# Patient Record
Sex: MALE | Race: WHITE | Employment: FULL TIME | URBAN - METROPOLITAN AREA
[De-identification: names, ages, dates, MRNs, and addresses within clinical notes are randomized per-mention and may not be internally consistent; named-entity substitution may affect disease eponyms.]

---

## 2017-02-24 ENCOUNTER — OFFICE VISIT (OUTPATIENT)
Dept: INTERNAL MEDICINE CLINIC | Age: 37
End: 2017-02-24

## 2017-02-24 VITALS
HEART RATE: 91 BPM | SYSTOLIC BLOOD PRESSURE: 112 MMHG | HEIGHT: 72 IN | WEIGHT: 168 LBS | BODY MASS INDEX: 22.75 KG/M2 | TEMPERATURE: 98.9 F | OXYGEN SATURATION: 97 % | DIASTOLIC BLOOD PRESSURE: 65 MMHG | RESPIRATION RATE: 18 BRPM

## 2017-02-24 DIAGNOSIS — J01.00 ACUTE MAXILLARY SINUSITIS, RECURRENCE NOT SPECIFIED: Primary | ICD-10-CM

## 2017-02-24 RX ORDER — AMOXICILLIN AND CLAVULANATE POTASSIUM 875; 125 MG/1; MG/1
1 TABLET, FILM COATED ORAL 2 TIMES DAILY
Qty: 20 TAB | Refills: 0 | Status: SHIPPED | OUTPATIENT
Start: 2017-02-24 | End: 2019-02-08 | Stop reason: ALTCHOICE

## 2017-02-24 NOTE — PATIENT INSTRUCTIONS
Sinusitis: Care Instructions  Your Care Instructions    Sinusitis is an infection of the lining of the sinus cavities in your head. Sinusitis often follows a cold. It causes pain and pressure in your head and face. In most cases, sinusitis gets better on its own in 1 to 2 weeks. But some mild symptoms may last for several weeks. Sometimes antibiotics are needed. Follow-up care is a key part of your treatment and safety. Be sure to make and go to all appointments, and call your doctor if you are having problems. It's also a good idea to know your test results and keep a list of the medicines you take. How can you care for yourself at home? · Take an over-the-counter pain medicine, such as acetaminophen (Tylenol), ibuprofen (Advil, Motrin), or naproxen (Aleve). Read and follow all instructions on the label. · If the doctor prescribed antibiotics, take them as directed. Do not stop taking them just because you feel better. You need to take the full course of antibiotics. · Be careful when taking over-the-counter cold or flu medicines and Tylenol at the same time. Many of these medicines have acetaminophen, which is Tylenol. Read the labels to make sure that you are not taking more than the recommended dose. Too much acetaminophen (Tylenol) can be harmful. · Breathe warm, moist air from a steamy shower, a hot bath, or a sink filled with hot water. Avoid cold, dry air. Using a humidifier in your home may help. Follow the directions for cleaning the machine. · Use saline (saltwater) nasal washes to help keep your nasal passages open and wash out mucus and bacteria. You can buy saline nose drops at a grocery store or drugstore. Or you can make your own at home by adding 1 teaspoon of salt and 1 teaspoon of baking soda to 2 cups of distilled water. If you make your own, fill a bulb syringe with the solution, insert the tip into your nostril, and squeeze gently. [place] your nose.   · Put a hot, wet towel or a warm gel pack on your face 3 or 4 times a day for 5 to 10 minutes each time. · Try a decongestant nasal spray like oxymetazoline (Afrin). Do not use it for more than 3 days in a row. Using it for more than 3 days can make your congestion worse. When should you call for help? Call your doctor now or seek immediate medical care if:  · You have new or worse swelling or redness in your face or around your eyes. · You have a new or higher fever. Watch closely for changes in your health, and be sure to contact your doctor if:  · You have new or worse facial pain. · The mucus from your nose becomes thicker (like pus) or has new blood in it. · You are not getting better as expected. Where can you learn more? Go to http://onur-antonieta.info/. Enter M580 in the search box to learn more about \"Sinusitis: Care Instructions. \"  Current as of: July 29, 2016  Content Version: 11.1  © [id number] [employer]. Care instructions adapted under license by MyEnergy (which disclaims liability or warranty for this information). If you have questions about a medical condition or this instruction, always ask your healthcare professional. [place] [address] any warranty or liability for your use of this information.

## 2017-02-24 NOTE — PROGRESS NOTES
Have you been to the ER or urgent care clinic since your last visit? No     Have you been hospitalized since your last visit? No     Have you been seen or consulted any other health care provider outside of [address] [place] [address] since your last visit (including pap smears, colonoscopy screening)?   No

## 2017-02-24 NOTE — MR AVS SNAPSHOT
Visit Information Date & Time Provider Department Dept. Phone Encounter #  
 2/24/2017  4:00 PM Jaja Joshua NP [facility] [address] [facility] [id number] Upcoming Health Maintenance Date Due INFLUENZA AGE 9 TO ADULT 8/1/2016 DTaP/Tdap/Td series (2 - Td) 12/19/2024 Allergies as of 2/24/2017  Review Complete On: 2/24/2017 By: Jaja Joshua NP No Known Allergies Current Immunizations  Reviewed on 1/14/2016 Name Date Hep B Vaccine 1/1/1997 Influenza Vaccine 12/19/2014, 1/1/2007 MMR 1/1/1997 TB Skin Test (PPD) 1/1/2001 Tdap 12/19/2014 Not reviewed this visit You Were Diagnosed With   
  
 Codes Comments Acute maxillary sinusitis, recurrence not specified    -  Primary ICD-10-CM: J01.00 ICD-9-CM: 461.0 Vitals BP  
  
  
  
  
  
 112/65 (BP 1 Location: Left arm, BP Patient Position: Sitting) BMI and BSA Data Body Mass Index Body Surface Area 22.78 kg/m 2 1.97 m 2 Preferred Pharmacy Pharmacy Name Phone [facility] - [address], [facility] [phone] Your Updated Medication List  
  
   
This list is accurate as of: 2/24/17  4:19 PM.  Always use your most recent med list.  
  
  
  
  
 amoxicillin-clavulanate 875-125 mg per tablet Commonly known as:  AUGMENTIN Take 1 Tab by mouth two (2) times a day. escitalopram oxalate 10 mg tablet Commonly known as:  Eugena Julio Take 1 Tab by mouth daily. ibuprofen 200 mg tablet Commonly known as:  MOTRIN Take  by mouth every six (6) hours as needed. loratadine 10 mg tablet Commonly known as:  Drena Magic Take 10 mg by mouth.  
  
 pseudoephedrine 30 mg tablet Commonly known as:  SUDAFED Take  by mouth every four (4) hours as needed for Congestion. XANAX PO Take  by mouth. Prescriptions Sent to Pharmacy Refills amoxicillin-clavulanate (AUGMENTIN) 875-125 mg per tablet 0 Sig: Take 1 Tab by mouth two (2) times a day. Class: Normal  
 Pharmacy: [address] #: [phone] Route: Oral  
  
Patient Instructions Sinusitis: Care Instructions Your Care Instructions Sinusitis is an infection of the lining of the sinus cavities in your head. Sinusitis often follows a cold. It causes pain and pressure in your head and face. In most cases, sinusitis gets better on its own in 1 to 2 weeks. But some mild symptoms may last for several weeks. Sometimes antibiotics are needed. Follow-up care is a key part of your treatment and safety. Be sure to make and go to all appointments, and call your doctor if you are having problems. It's also a good idea to know your test results and keep a list of the medicines you take. How can you care for yourself at home? · Take an over-the-counter pain medicine, such as acetaminophen (Tylenol), ibuprofen (Advil, Motrin), or naproxen (Aleve). Read and follow all instructions on the label. · If the doctor prescribed antibiotics, take them as directed. Do not stop taking them just because you feel better. You need to take the full course of antibiotics. · Be careful when taking over-the-counter cold or flu medicines and Tylenol at the same time. Many of these medicines have acetaminophen, which is Tylenol. Read the labels to make sure that you are not taking more than the recommended dose. Too much acetaminophen (Tylenol) can be harmful. · Breathe warm, moist air from a steamy shower, a hot bath, or a sink filled with hot water. Avoid cold, dry air. Using a humidifier in your home may help. Follow the directions for cleaning the machine. · Use saline (saltwater) nasal washes to help keep your nasal passages open and wash out mucus and bacteria.  You can buy saline nose drops at a grocery store or drugstore. Or you can make your own at home by adding 1 teaspoon of salt and 1 teaspoon of baking soda to 2 cups of distilled water. If you make your own, fill a bulb syringe with the solution, insert the tip into your nostril, and squeeze gently. A[place] Ruffini your nose. · Put a hot, wet towel or a warm gel pack on your face 3 or 4 times a day for 5 to 10 minutes each time. · Try a decongestant nasal spray like oxymetazoline (Afrin). Do not use it for more than 3 days in a row. Using it for more than 3 days can make your congestion worse. When should you call for help? Call your doctor now or seek immediate medical care if: 
· You have new or worse swelling or redness in your face or around your eyes. · You have a new or higher fever. Watch closely for changes in your health, and be sure to contact your doctor if: 
· You have new or worse facial pain. · The mucus from your nose becomes thicker (like pus) or has new blood in it. · You are not getting better as expected. Where can you learn more? Go to http://onur-antonieta.info/. Enter B659 in the search box to learn more about \"Sinusitis: Care Instructions. \" Current as of: July 29, 2016 Content Version: 11.1 © 6388-7325 [employer] Incorporated. Care instructions adapted under license by Athlete[employer] (which disclaims liability or warranty for this information). If you have questions about a medical condition or this instruction, always ask your healthcare professional. [place] [address] any warranty or liability for your use of this information. Introducing [facility] & HEALTH SERVICES! Dear Becca Geiger: 
Thank you for requesting a [facility] account. Our records indicate that you have previously registered for a [facility] account but its currently inactive. Please call our [facility] support line at [phone]. Additional Information If you have questions, please visit the Frequently Asked Questions section of the [employer] website at [email or web address] Remember, [employer] is NOT to be used for urgent needs. For medical emergencies, dial 911. Now available from your iPhone and Android! Please provide this summary of care documentation to your next provider. Your primary care clinician is listed as Milo Ortega. If you have any questions after today's visit, please call [phone].

## 2017-02-25 NOTE — PROGRESS NOTES
HISTORY OF PRESENT ILLNESS  Deja De Luna is a 43 y.o. male. HPI  Upper respiratory illness:  Deja De Luna presents with complaints of congestion, sore throat, post nasal drip, headache, generalized sinus pain and green nasal discharge for 2 weeks. Began as cold symptoms that have progressively worsened. No nausea and no vomiting . he has not had  fever and chills. Symptoms are moderate. Over-the-counter remedies including Mucinex, Sudafed   has been used with poor relief of symptoms. Drinking plenty of fluids: yes  Asthma?:  no  non-smoker  Contacts with similar infections: no       Review of Systems   Constitutional: Negative for chills and fever. HENT: Positive for congestion and sore throat. Respiratory: Negative for cough and shortness of breath. Cardiovascular: Negative for chest pain and palpitations. Gastrointestinal: Negative for nausea and vomiting. Musculoskeletal: Negative for myalgias. Neurological: Positive for headaches. Negative for dizziness and tingling. /65 (BP 1 Location: Left arm, BP Patient Position: Sitting)  Pulse 91  Temp 98.9 °F (37.2 °C) (Oral)   Resp 18  Ht 6' (1.829 m)  Wt 168 lb (76.2 kg)  SpO2 97%  BMI 22.78 kg/m2  Physical Exam   Constitutional: He is oriented to person, place, and time. He appears well-developed and well-nourished. HENT:   Head: Normocephalic and atraumatic. Right Ear: External ear normal.   Left Ear: External ear normal.   Nose: Mucosal edema present. Right sinus exhibits maxillary sinus tenderness. Left sinus exhibits maxillary sinus tenderness. Mouth/Throat: Posterior oropharyngeal erythema present. Neck: Normal range of motion. Neck supple. No thyromegaly present. Cardiovascular: Normal rate and regular rhythm. Pulmonary/Chest: Effort normal and breath sounds normal. He has no wheezes. Lymphadenopathy:     He has no cervical adenopathy.    Neurological: He is alert and oriented to person, place, and time. Psychiatric: He has a normal mood and affect. His behavior is normal.   Nursing note and vitals reviewed. ASSESSMENT and PLAN  Stephen Adams was seen today for pressure behind the eyes. Diagnoses and all orders for this visit:    Acute maxillary sinusitis, recurrence not specified -- has failed conservative therapy  -     amoxicillin-clavulanate (AUGMENTIN) 875-125 mg per tablet; Take 1 Tab by mouth two (2) times a day.       reviewed diet, exercise and weight control  reviewed medications and side effects in detail

## 2017-03-01 ENCOUNTER — OFFICE VISIT (OUTPATIENT)
Dept: INTERNAL MEDICINE CLINIC | Age: 37
End: 2017-03-01

## 2017-03-01 VITALS
RESPIRATION RATE: 12 BRPM | WEIGHT: 167 LBS | TEMPERATURE: 98.6 F | DIASTOLIC BLOOD PRESSURE: 75 MMHG | BODY MASS INDEX: 22.62 KG/M2 | SYSTOLIC BLOOD PRESSURE: 108 MMHG | HEART RATE: 96 BPM | HEIGHT: 72 IN

## 2017-03-01 DIAGNOSIS — F41.9 ANXIETY: Primary | ICD-10-CM

## 2017-03-01 RX ORDER — ESCITALOPRAM OXALATE 10 MG/1
10 TABLET ORAL DAILY
Qty: 30 TAB | Refills: 2 | Status: SHIPPED | OUTPATIENT
Start: 2017-03-01 | End: 2019-02-08 | Stop reason: ALTCHOICE

## 2017-03-01 NOTE — PROGRESS NOTES
HISTORY OF PRESENT ILLNESS    Chief Complaint   Patient presents with    Anxiety       Presents for follow-up    He left his job at [employer]. 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 Recent sinusitis. Taking augmentin. Reports mild post-nasal drip and sore throat now. Improving. Anxiety  Patient is seen for anxiety disorder. Current treatment includes Lexapro 10 mg and rare xanax. Better since he left work. Ongoing symptoms include: none. Patient denies: sweating, chest pain, dizziness, paresthesias, racing thoughts, feelings of losing control, difficulty concentrating, suicidal ideation. Denies substance or alcohol abuse. Reported side effects from the treatment: none. He is ready to cut back lexapro    Review of Systems   All other systems reviewed and are negative, except as noted in HPI    Past Medical and Surgical History   has a past medical history of Anxiety; Cerumen impaction; Deafness in right ear; Heart palpitations; Incomplete right bundle branch block (8/2014); Lipoma of arm (9/8/2014); Normal cardiac stress test (9/2/14); Paresthesia; and Patellofemoral instability. has a past surgical history that includes other surgical; wisdom teeth extraction (1997); and knee arthroscopy (Left, 4/2011, 10/2011). reports that he has never smoked. He has never used smokeless tobacco. He reports that he drinks about 1.0 oz of alcohol per week  He reports that he does not use illicit drugs. family history includes Cancer in his father and maternal grandfather; Diabetes in his mother; Hypertension in his father. Physical Exam   Nursing note and vitals reviewed. Blood pressure 108/75, pulse 96, temperature 98.6 °F (37 °C), temperature source Oral, resp. rate 12, height 6' (1.829 m), weight 167 lb (75.8 kg). Constitutional:  No distress. Eyes: Conjunctivae are normal.   Ears:  Hearing grossly intact  Cardiovascular: Normal rate.   regular rhythm, no murmurs or gallops  No edema  Pulmonary/Chest: Effort normal.   CTAB  Musculoskeletal: moves all 4 extremities   Neurological: Alert and oriented to person, place, and time. Skin: No rash noted. Psychiatric: Normal mood and affect. Behavior is normal.     ASSESSMENT and PLAN  Sergo Shaikh was seen today for anxiety. Diagnoses and all orders for this visit:    Anxiety  Controlled on current regimen. He can wean back lexapro to 5 mg daily for 1 month, then consider stopping if doing well  Less stress at work. Orders:  -     escitalopram oxalate (LEXAPRO) 10 mg tablet; Take 1 Tab by mouth daily. URI (upper respiratory infection)  Mild s/s. Try mucinex 1200mg twice daily, tylenol or advil as directed on bottle. lab results and schedule of future lab studies reviewed with patient  reviewed medications and side effects in detail    Return to clinic for further evaluation if new symptoms develop    Follow-up Disposition: Not on File    Current Outpatient Prescriptions   Medication Sig    GUAIFENESIN PO Take  by mouth.  escitalopram oxalate (LEXAPRO) 10 mg tablet Take 1 Tab by mouth daily.  amoxicillin-clavulanate (AUGMENTIN) 875-125 mg per tablet Take 1 Tab by mouth two (2) times a day.  pseudoephedrine (SUDAFED) 30 mg tablet Take  by mouth every four (4) hours as needed for Congestion.  loratadine (CLARITIN) 10 mg tablet Take 10 mg by mouth.  ibuprofen (MOTRIN) 200 mg tablet Take  by mouth every six (6) hours as needed. No current facility-administered medications for this visit.

## 2017-03-01 NOTE — MR AVS SNAPSHOT
Visit Information Date & Time Provider Department Dept. Phone Encounter #  
 3/1/2017  4:00 PM Arturo Jones MD [facility] [address] [id number] Upcoming Health Maintenance Date Due INFLUENZA AGE 9 TO ADULT 8/1/2016 DTaP/Tdap/Td series (2 - Td) 12/19/2024 Allergies as of 3/1/2017  Review Complete On: 3/1/2017 By: Arturo Jones MD  
 No Known Allergies Current Immunizations  Reviewed on 1/14/2016 Name Date Hep B Vaccine 1/1/1997 Influenza Vaccine 12/19/2014, 1/1/2007 MMR 1/1/1997 TB Skin Test (PPD) 1/1/2001 Tdap 12/19/2014 Not reviewed this visit You Were Diagnosed With   
  
 Codes Comments Anxiety    -  Primary ICD-10-CM: F41.9 ICD-9-CM: 300.00 Vitals BP  
  
  
  
  
  
 108/75 (BP 1 Location: Left arm, BP Patient Position: Sitting) Vitals History BMI and BSA Data Body Mass Index Body Surface Area  
 22.65 kg/m 2 1.96 m 2 Preferred Pharmacy Pharmacy Name Phone [facility] - [address], [facility] [phone] Your Updated Medication List  
  
   
This list is accurate as of: 3/1/17  4:28 PM.  Always use your most recent med list.  
  
  
  
  
 amoxicillin-clavulanate 875-125 mg per tablet Commonly known as:  AUGMENTIN Take 1 Tab by mouth two (2) times a day. escitalopram oxalate 10 mg tablet Commonly known as:  Mcmahon Bias Take 1 Tab by mouth daily. GUAIFENESIN PO Take  by mouth. ibuprofen 200 mg tablet Commonly known as:  MOTRIN Take  by mouth every six (6) hours as needed. loratadine 10 mg tablet Commonly known as:  Farrah Soda Take 10 mg by mouth.  
  
 pseudoephedrine 30 mg tablet Commonly known as:  SUDAFED Take  by mouth every four (4) hours as needed for Congestion. Prescriptions Sent to Pharmacy  Refills  
 escitalopram oxalate (LEXAPRO) 10 mg tablet 2  
 Sig: Take 1 Tab by mouth daily. Class: Normal  
 Pharmacy: [facility], [address] [place] [address] #: [phone] Route: Oral  
  
Introducing [facility] SERVICES! Dear Becca Geiger: 
Thank you for requesting a [employer] account. Our records indicate that you have previously registered for a [employer]Halo account but its currently inactive. Please call our [employer] support line at [phone]. Additional Information If you have questions, please visit the Frequently Asked Questions section of the grabHalo website at [email or web address] Remember, grabHalo is NOT to be used for urgent needs. For medical emergencies, dial 911. Now available from your iPhone and Android! Please provide this summary of care documentation to your next provider. Your primary care clinician is listed as Isaac Lennon. If you have any questions after today's visit, please call [phone].

## 2017-10-19 ENCOUNTER — OFFICE VISIT (OUTPATIENT)
Dept: INTERNAL MEDICINE CLINIC | Age: 37
End: 2017-10-19

## 2017-10-19 DIAGNOSIS — Z23 ENCOUNTER FOR IMMUNIZATION: Primary | ICD-10-CM

## 2017-10-19 NOTE — PROGRESS NOTES
Pharmacy Note - Immunizations    María Calderon is a 43 y.o.  male  who presents for a flu shot. He denies any symptoms, reactions or allergies that would exclude them from being immunized today. Risks and adverse reactions were discussed and the VIS was given to them. All questions were addressed. Verbal order received from [address] This Encounter    Influenza virus vaccine (QUADRIVALENT PRES FREE SYRINGE) IM ([id number])    NE IMMUNIZ ADMIN,1 SINGLE/COMB VAC/TOXOID       Vaccine was given with no complications or adverse reactions.     Gay Webb, PharmD, BCPS, CDE

## 2017-12-18 ENCOUNTER — TELEPHONE (OUTPATIENT)
Dept: INTERNAL MEDICINE CLINIC | Age: 37
End: 2017-12-18

## 2017-12-18 RX ORDER — AMOXICILLIN AND CLAVULANATE POTASSIUM 875; 125 MG/1; MG/1
1 TABLET, FILM COATED ORAL 2 TIMES DAILY
Qty: 20 TAB | Refills: 0 | Status: SHIPPED | OUTPATIENT
Start: 2017-12-18 | End: 2017-12-28

## 2017-12-18 NOTE — TELEPHONE ENCOUNTER
Reached out to patient to schedule an acute appointment.  Patient schedule an appointment for 12/19 @ 10:40 w /NP for URI

## 2017-12-18 NOTE — TELEPHONE ENCOUNTER
----- Message from Lorne Zeng sent at 12/18/2017 11:52 AM EST -----  Regarding: Dr. Verna Gallagher  Pt is having head cold sx with drainage for the past couple of days and would like to be seen today with any provider. Best contact number [phone].

## 2018-02-28 RX ORDER — CLOTRIMAZOLE AND BETAMETHASONE DIPROPIONATE 10; .64 MG/G; MG/G
CREAM TOPICAL 2 TIMES DAILY
Qty: 30 G | Refills: 0 | Status: SHIPPED | OUTPATIENT
Start: 2018-02-28 | End: 2019-02-08 | Stop reason: ALTCHOICE

## 2019-02-08 ENCOUNTER — OFFICE VISIT (OUTPATIENT)
Dept: INTERNAL MEDICINE CLINIC | Age: 39
End: 2019-02-08

## 2019-02-08 VITALS
TEMPERATURE: 98.9 F | DIASTOLIC BLOOD PRESSURE: 81 MMHG | WEIGHT: 180 LBS | RESPIRATION RATE: 18 BRPM | HEIGHT: 72 IN | OXYGEN SATURATION: 94 % | HEART RATE: 77 BPM | SYSTOLIC BLOOD PRESSURE: 121 MMHG | BODY MASS INDEX: 24.38 KG/M2

## 2019-02-08 DIAGNOSIS — B35.4 TINEA CORPORIS: Primary | ICD-10-CM

## 2019-02-08 RX ORDER — FLUCONAZOLE 150 MG/1
150 TABLET ORAL
Qty: 4 TAB | Refills: 0 | Status: SHIPPED | OUTPATIENT
Start: 2019-02-08 | End: 2019-03-02

## 2019-02-08 RX ORDER — KETOCONAZOLE 20 MG/G
CREAM TOPICAL DAILY
Qty: 60 G | Refills: 1 | Status: SHIPPED | OUTPATIENT
Start: 2019-02-08 | End: 2020-04-27 | Stop reason: ALTCHOICE

## 2019-02-08 NOTE — PATIENT INSTRUCTIONS
Ringworm: Care Instructions  Your Care Instructions  Ringworm is a fungus infection of the skin. It is not caused by a worm. Ringworm causes a round, scaly rash that may crack and itch. The rash can spread over a wide area. One type of fungus that causes ringworm is often found in locker rooms and swimming pools. It grows well in warm, moist areas of the skin, such as in skin folds. You can get ringworm by sharing towels, clothing, and sports equipment. You can also get it by touching someone who has ringworm. Ringworm is treated with cream that kills the fungus. If the rash is widespread, you may need pills to get rid of it. Ringworm often comes back after treatment. If the rash becomes infected with bacteria, you may need antibiotics. Follow-up care is a key part of your treatment and safety. Be sure to make and go to all appointments, and call your doctor if you are having problems. It's also a good idea to know your test results and keep a list of the medicines you take. How can you care for yourself at home? · Take your medicines exactly as prescribed. Call your doctor if you have any problems with your medicine. · Wash the rash with soap and water, remove flaky skin, and dry thoroughly. · Try an over-the-counter cream with clotrimazole or miconazole in it. Brand names include Lotrimin, Micatin, and Tinactin. Terbinafine cream (Lamisil) is also available without a prescription. Spread the cream beyond the edge or border of the rash. Follow the directions on the package. Do not stop using the medicine just because your skin clears up. You will probably need to continue treatment for 2 to 4 weeks. · To keep from getting another infection:  ? Do not go barefoot in public places such as gyms or locker rooms. Avoid sharing towels and clothes. Use flip-flops or some other type of shoe in the shower.   ? Do not wear tight clothes or let your skin stay damp for long periods, such as by staying in a wet bathing suit or sweaty clothes. When should you call for help? Call your doctor now or seek immediate medical care if:    · You have signs of infection such as:  ? Pain, warmth, or swelling in your skin. ? Red streaks near a wound in the skin. ? Pus coming from the rash on your skin. ? A fever.    Watch closely for changes in your health, and be sure to contact your doctor if:    · Your ringworm does not improve after 2 weeks of treatment.     · You do not get better as expected. Where can you learn more? Go to http://onur-antonieta.info/. Enter S628 in the search box to learn more about \"Ringworm: Care Instructions. \"  Current as of: April 17, 2018  Content Version: 11.9  © 0615-0257 [employer]oundedPower. Care instructions adapted under license by Wisr (which disclaims liability or warranty for this information). If you have questions about a medical condition or this instruction, always ask your healthcare professional. [address] any warranty or liability for your use of this information.

## 2019-02-09 NOTE — PROGRESS NOTES
HISTORY OF PRESENT ILLNESS  Travis Osborne is a 34 y.o. male. HPI  Presents with complaints of persistent itchy, scaling rash to lateral aspect of right lower leg for the past year. Was treated in 2/2018 with Lotrisone cream with some initial improvement but then rash returned and has been worsening. Very itchy at times. Has not noted any rash elsewhere on skin. Review of Systems   Constitutional: Negative for chills and fever. HENT: Negative for congestion and sore throat. Respiratory: Negative for cough and shortness of breath. Cardiovascular: Negative for chest pain. Skin: Positive for itching and rash. Neurological: Negative for dizziness, tingling and headaches. Physical Exam   Constitutional: He is oriented to person, place, and time. He appears well-developed and well-nourished. HENT:   Head: Normocephalic and atraumatic. Cardiovascular: Normal rate and regular rhythm. Pulmonary/Chest: Effort normal and breath sounds normal.   Neurological: He is alert and oriented to person, place, and time. Skin: Rash noted. Rash is maculopapular. Erythematous rash 4 inch x 3 inch with papular border and central clearing to right lateral calf with several satellite lesions. Psychiatric: He has a normal mood and affect. His behavior is normal.   Nursing note and vitals reviewed. ASSESSMENT and PLAN  Diagnoses and all orders for this visit:    1. Tinea corporis -- will treat with systemic anti-fungal as well as topical due to persistence of symptoms. -     fluconazole (DIFLUCAN) 150 mg tablet; Take 1 Tab by mouth every seven (7) days for 4 doses. -     ketoconazole (NIZORAL) 2 % topical cream; Apply  to affected area daily.       reviewed diet, exercise and weight control  reviewed medications and side effects in detail

## 2019-03-11 ENCOUNTER — OFFICE VISIT (OUTPATIENT)
Dept: FAMILY MEDICINE CLINIC | Age: 39
End: 2019-03-11

## 2019-03-11 VITALS
SYSTOLIC BLOOD PRESSURE: 121 MMHG | HEART RATE: 90 BPM | RESPIRATION RATE: 17 BRPM | BODY MASS INDEX: 24.11 KG/M2 | TEMPERATURE: 97.8 F | HEIGHT: 72 IN | DIASTOLIC BLOOD PRESSURE: 82 MMHG | OXYGEN SATURATION: 96 % | WEIGHT: 178 LBS

## 2019-03-11 DIAGNOSIS — J02.9 PHARYNGITIS, UNSPECIFIED ETIOLOGY: Primary | ICD-10-CM

## 2019-03-11 DIAGNOSIS — J02.9 SORE THROAT: ICD-10-CM

## 2019-03-11 LAB
S PYO AG THROAT QL: NEGATIVE
VALID INTERNAL CONTROL?: YES

## 2019-03-11 RX ORDER — AMOXICILLIN 875 MG/1
875 TABLET, FILM COATED ORAL 2 TIMES DAILY
Qty: 20 TAB | Refills: 0 | Status: SHIPPED | OUTPATIENT
Start: 2019-03-11 | End: 2019-03-21

## 2019-03-11 NOTE — PROGRESS NOTES
HISTORY OF PRESENT ILLNESS  Eli Olivia is a 34 y.o. male. HPI   This gentleman complains of a 2 day hx of sore throat. No fever  He has also had some nasal congestion and cough  No CP or SOB    Review of Systems   Constitutional: Negative for chills and fever. HENT: Positive for congestion. Negative for sore throat. Respiratory: Positive for cough. Negative for sputum production. Cardiovascular: Negative for chest pain. Musculoskeletal: Negative for myalgias. Physical Exam   Constitutional: He appears well-developed and well-nourished. No distress. HENT:   Right Ear: External ear normal.   Left Ear: External ear normal.   Nose: Nose normal.   Mouth/Throat: Oropharynx is clear and moist. No oropharyngeal exudate. Neck: Normal range of motion. Neck supple. Cardiovascular: Normal rate, regular rhythm and normal heart sounds. No murmur heard. Pulmonary/Chest: Effort normal and breath sounds normal. No respiratory distress. He has no wheezes. He has no rales. Skin: He is not diaphoretic. ASSESSMENT and PLAN    ICD-10-CM ICD-9-CM    1. Pharyngitis, unspecified etiology J02.9 462    2.  Sore throat J02.9 462 AMB POC RAPID STREP A   start amoxicillin 875 bid  Precautions given

## 2019-03-11 NOTE — PROGRESS NOTES
Chief Complaint   Patient presents with    Cold Symptoms     Pt c/o cough and congestion x 2 days. Pt has taken Mucinex and sudafed for relief.

## 2020-01-31 RX ORDER — AMOXICILLIN 875 MG/1
875 TABLET, FILM COATED ORAL 2 TIMES DAILY
Qty: 20 TAB | Refills: 0 | Status: SHIPPED | OUTPATIENT
Start: 2020-01-31 | End: 2020-01-31 | Stop reason: SDUPTHER

## 2020-01-31 RX ORDER — AMOXICILLIN 875 MG/1
875 TABLET, FILM COATED ORAL 2 TIMES DAILY
Qty: 20 TAB | Refills: 0 | Status: SHIPPED | OUTPATIENT
Start: 2020-01-31 | End: 2020-02-10

## 2020-04-27 ENCOUNTER — VIRTUAL VISIT (OUTPATIENT)
Dept: INTERNAL MEDICINE CLINIC | Age: 40
End: 2020-04-27

## 2020-04-27 VITALS
DIASTOLIC BLOOD PRESSURE: 70 MMHG | SYSTOLIC BLOOD PRESSURE: 120 MMHG | BODY MASS INDEX: 23.7 KG/M2 | WEIGHT: 175 LBS | HEIGHT: 72 IN | TEMPERATURE: 97 F

## 2020-04-27 DIAGNOSIS — F41.9 ANXIETY: Primary | ICD-10-CM

## 2020-04-27 RX ORDER — ESCITALOPRAM OXALATE 10 MG/1
10 TABLET ORAL DAILY
Qty: 30 TAB | Refills: 2 | Status: SHIPPED | OUTPATIENT
Start: 2020-04-27 | End: 2020-07-23 | Stop reason: SDUPTHER

## 2020-04-27 RX ORDER — ALPRAZOLAM 0.25 MG/1
0.25 TABLET ORAL
Qty: 21 TAB | Refills: 0 | Status: SHIPPED | OUTPATIENT
Start: 2020-04-27 | End: 2022-04-22 | Stop reason: ALTCHOICE

## 2020-04-27 NOTE — PROGRESS NOTES
Consent: Jacobo Lizarraga, who was seen by synchronous (real-time) audio-video technology, and/or his healthcare decision maker, is aware that this patient-initiated, Telehealth encounter on 4/27/2020 is a billable service, with coverage as determined by his insurance carrier. He is aware that he may receive a bill and has provided verbal consent to proceed: YES  712  Subjective:   Jacobo Lizarraga is a 37 y.o. 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 Has a 59-year-old twins and a 11year-old child as well. Has been working from home due to coronavirus pandemic. Reports increased anxiety over the past couple of weeks, more significant this morning when he felt like he could not focus well for a couple of hours. He became somewhat irritable as well. History of anxiety in the past treated with Lexapro and alprazolam.  Weaned off of Lexapro in 2017. Says that he can sense when the anxiety starts spiraling again and feels this is a good time to restart Lexapro again. He is sleeping fairly well. Admits he does not have time or take time to exercise. Denies any depressive symptoms. No suicidal or homicidal ideation. Denies excessive alcohol use or any drug use. Current Outpatient Medications   Medication Sig    escitalopram oxalate (LEXAPRO) 10 mg tablet Take 1 Tab by mouth daily.  ALPRAZolam (XANAX) 0.25 mg tablet Take 1 Tab by mouth three (3) times daily as needed for Anxiety or Sleep. No alcohol within 6 hours of taking this    ibuprofen (MOTRIN) 200 mg tablet Take  by mouth every six (6) hours as needed. No current facility-administered medications for this visit.         No Known Allergies    Past Medical History:   Diagnosis Date    Anxiety     Cerumen impaction     Deafness in right ear     80% deaf on right    Heart palpitations     Dr. Eric Schroeder.  stress echo, EKG done    Incomplete right bundle branch block 8/2014    Lipoma of arm 9/8/2014    left medial    Normal cardiac stress test 9/2/14    normal    Paresthesia     Patellofemoral instability     hx left surgery       ROS  All other systems reviewed and negative, unless mentioned in HPI    Objective:   Vital Signs: (As obtained by patient/caregiver at home)  Visit Vitals  /70 (BP 1 Location: Right arm, BP Patient Position: Sitting) Comment: per pt   Temp 97 °F (36.1 °C) (Temporal)   Ht 6' (1.829 m)   Wt 175 lb (79.4 kg) Comment: per pt   BMI 23.73 kg/m²        [INSTRUCTIONS:  \"[x]\" Indicates a positive item  \"[]\" Indicates a negative item  -- DELETE ALL ITEMS NOT EXAMINED]    Constitutional: [x] Appears well-developed and well-nourished [x] No apparent distress      [] Abnormal -     Mental status: [x] Alert and awake  [x] Oriented to person/place/time [x] Able to follow commands    [] Abnormal -     Eyes:   EOM    [x]  Normal    [] Abnormal -   Sclera  [x]  Normal    [] Abnormal -          Discharge [x]  None visible   [] Abnormal -     HENT: [x] Normocephalic, atraumatic  [] Abnormal -   [x] Mouth/Throat: Mucous membranes are moist    External Ears [x] Normal  [] Abnormal -    Neck: [x] No visualized mass [] Abnormal -     Pulmonary/Chest: [x] Respiratory effort normal   [x] No visualized signs of difficulty breathing or respiratory distress        [] Abnormal -      Musculoskeletal:   [x] Normal gait with no signs of ataxia         [x] Normal range of motion of neck        [] Abnormal -     Neurological:        [x] No Facial Asymmetry (Cranial nerve 7 motor function) (limited exam due to video visit)          [x] No gaze palsy        [] Abnormal -          Skin:        [x] No significant exanthematous lesions or discoloration noted on facial skin         [] Abnormal -            Psychiatric:       [x] Normal Affect [] Abnormal -        [x] No Hallucinations    Other pertinent observable physical exam findings:-        Assessment & Plan: Diagnoses and all orders for this visit:    1. Anxiety  Discussed the importance of increasing exercise, mindfulness, breathing. Will resume Lexapro. He is aware that it takes 2 to 4 weeks to reach steady state. Will also give a sparing use of Xanax for severe anxiety symptom which he has never abused or used often even. Voices understanding. Avoid alcohol with alprazolam.   follow-up in 3 weeks. -     escitalopram oxalate (LEXAPRO) 10 mg tablet; Take 1 Tab by mouth daily.  -     ALPRAZolam (XANAX) 0.25 mg tablet; Take 1 Tab by mouth three (3) times daily as needed for Anxiety or Sleep. No alcohol within 6 hours of taking this            We discussed the expected course, resolution and complications of the diagnosis(es) in detail. Medication risks, benefits, costs, interactions, and alternatives were discussed as indicated. I advised him to contact the office if his condition worsens, changes or fails to improve as anticipated. He expressed understanding with the diagnosis(es) and plan. Ratna Bartlett is a 37 y.o. male being evaluated by a video visit encounter for concerns as above. A caregiver was present when appropriate. Due to this being a TeleHealth encounter (During [facility] public health emergency), evaluation of the following organ systems was limited: Vitals/Constitutional/EENT/Resp/CV/GI//MS/Neuro/Skin/Heme-Lymph-Imm. Pursuant to the emergency declaration under the [facility], [facility] waiver authority and the [employer] General Act, this Virtual  Visit was conducted, with patient's (and/or legal guardian's) consent, to reduce the patient's risk of exposure to COVID-19 and provide necessary medical care. Services were provided through a video synchronous discussion virtually to substitute for in-person clinic visit. Patient and provider were located at their individual homes.     Sol Chou MD

## 2020-07-23 DIAGNOSIS — F41.9 ANXIETY: ICD-10-CM

## 2020-07-23 RX ORDER — ESCITALOPRAM OXALATE 10 MG/1
10 TABLET ORAL DAILY
Qty: 30 TAB | Refills: 0 | Status: SHIPPED | OUTPATIENT
Start: 2020-07-23 | End: 2020-08-13 | Stop reason: SDUPTHER

## 2020-07-30 ENCOUNTER — OFFICE VISIT (OUTPATIENT)
Dept: INTERNAL MEDICINE CLINIC | Age: 40
End: 2020-07-30

## 2020-07-30 VITALS
HEART RATE: 74 BPM | TEMPERATURE: 98.7 F | BODY MASS INDEX: 24.65 KG/M2 | DIASTOLIC BLOOD PRESSURE: 71 MMHG | WEIGHT: 182 LBS | RESPIRATION RATE: 18 BRPM | OXYGEN SATURATION: 95 % | SYSTOLIC BLOOD PRESSURE: 109 MMHG | HEIGHT: 72 IN

## 2020-07-30 DIAGNOSIS — F41.9 ANXIETY: ICD-10-CM

## 2020-07-30 DIAGNOSIS — Z00.00 PREVENTATIVE HEALTH CARE: Primary | ICD-10-CM

## 2020-07-30 NOTE — PROGRESS NOTES
Montse Munoz is a 37 y.o. male  Presenting for his annual checkup and health maintenance review and follow-up    Has a 27-year-old twins and a 11year-old child as well. Has been working from home due to coronavirus pandemic. Anxiety  Patient is seen for anxiety disorder. Focus, concentration are improved. Current treatment includes Lexapro and rare xanax (not taking)  Resumed lexapro 4/27/20 due to uncontrolled s/s. Ongoing symptoms include: none. Patient denies: sweating, chest pain, dizziness, paresthesias, racing thoughts, feelings of losing control, difficulty concentrating, suicidal ideation. Denies substance or alcohol abuse. Reported side effects from the treatment: none.     Wt Readings from Last 3 Encounters:   07/30/20 182 lb (82.6 kg)   04/27/20 175 lb (79.4 kg)   03/11/19 178 lb (80.7 kg)         Exercise: not active  Diet: exercises sporadically  Health Maintenance   Topic Date Due    Lipid Screen  11/14/2018    Influenza Age 5 to Adult  08/01/2020    DTaP/Tdap/Td series (2 - Td) 12/19/2024    Pneumococcal 0-64 years  Aged Donny Brothers reviewed  Last digital rectal exam:  none  No results found for: PSA, Vonna Mater, [id number], [id number], PSALT    Vaccinations reviewed  Immunization History   Administered Date(s) Administered    Hep B Vaccine 01/01/1997    Influenza Vaccine 01/01/2007, 12/19/2014, 10/01/2018    Influenza Vaccine (Quad) PF 10/19/2017    MMR 01/01/1997    TB Skin Test (PPD) 01/01/2001    Tdap 12/19/2014       Past Medical History:   Diagnosis Date    Anxiety     Cerumen impaction     Deafness in right ear     80% deaf on right    Heart palpitations     Dr. Phani Watson.  stress echo, EKG done    Incomplete right bundle branch block 8/2014    Lipoma of arm 9/8/2014    left medial    Normal cardiac stress test 9/2/14    normal    Patellofemoral instability     hx left surgery      has a past surgical history that includes hx other surgical; hx wisdom teeth extraction (1997); and hx knee arthroscopy (Left, 4/2011, 10/2011). Patient has no known allergies. Current Outpatient Medications   Medication Sig    escitalopram oxalate (LEXAPRO) 10 mg tablet Take 1 Tab by mouth daily.  ALPRAZolam (XANAX) 0.25 mg tablet Take 1 Tab by mouth three (3) times daily as needed for Anxiety or Sleep. No alcohol within 6 hours of taking this    ibuprofen (MOTRIN) 200 mg tablet Take  by mouth every six (6) hours as needed. No current facility-administered medications for this visit. SOCIAL HX:  reports that he has never smoked. He has never used smokeless tobacco. He reports current alcohol use of about 1.7 standard drinks of alcohol per week. He reports that he does not use drugs. FAMILY HX: family history includes Cancer in his father and maternal grandfather; Diabetes in his mother; Hypertension in his father. Review of Systems - History obtained from the patient  General ROS: negative for - night sweats, weight gain or weight loss  Cardiovascular ROS: no chest pain, dyspnea on exertion, edema    Physical exam  Blood pressure 109/71, pulse 74, temperature 98.7 °F (37.1 °C), resp. rate 18, height 6' (1.829 m), weight 182 lb (82.6 kg), SpO2 95 %. Wt Readings from Last 3 Encounters:   07/30/20 182 lb (82.6 kg)   04/27/20 175 lb (79.4 kg)   03/11/19 178 lb (80.7 kg)     He appears well, alert and oriented x 3, pleasant and cooperative. Vitals as noted. No rashes or significant lesions. Neck supple and free of adenopathy, or masses. No thyromegaly or carotid bruits. Cranial nerves normal. Lungs are clear to auscultation. Heart sounds are normal with no murmurs, clicks, gallops or rubs. Abdomen is soft, non- tender, with no masses or organomegaly. Extremities, peripheral pulses and reflexes are normal.  Skin is without rashes or suspicious lesions. Assessment and Plan:    1. Preventative health care  He is UTD.     Needs to improve diet and reduce weight.  - LIPID PANEL; Future  - CBC W/O DIFF; Future  - METABOLIC PANEL, COMPREHENSIVE; Future    2. Anxiety  Controlled on current regimen. Continue current medications as written in chart.       The patient is asked to make an attempt to improve diet and exercise patterns  Avoid tobacco products, excessive alcohol    Return for yearly wellness visits

## 2020-09-23 ENCOUNTER — EMPLOYEE WELLNESS (OUTPATIENT)
Dept: FAMILY MEDICINE CLINIC | Age: 40
End: 2020-09-23

## 2020-09-23 ENCOUNTER — EMPLOYEE WELLNESS (OUTPATIENT)
Dept: OTHER | Age: 40
End: 2020-09-23

## 2020-09-23 LAB
CHOLEST SERPL-MCNC: 260 MG/DL
GLUCOSE SERPL-MCNC: 90 MG/DL (ref 65–100)
HDLC SERPL-MCNC: 28 MG/DL
LDLC SERPL CALC-MCNC: ABNORMAL MG/DL (ref 0–100)
LDLC SERPL DIRECT ASSAY-MCNC: 139 MG/DL (ref 0–100)
TRIGL SERPL-MCNC: 573 MG/DL (ref ?–150)

## 2020-09-28 DIAGNOSIS — E78.1 HYPERTRIGLYCERIDEMIA: Primary | ICD-10-CM

## 2020-09-30 LAB
ALBUMIN SERPL-MCNC: 4.7 G/DL (ref 3.5–5)
ALBUMIN/GLOB SERPL: 1.3 {RATIO} (ref 1.1–2.2)
ALP SERPL-CCNC: 91 U/L (ref 45–117)
ALT SERPL-CCNC: 58 U/L (ref 12–78)
ANION GAP SERPL CALC-SCNC: 9 MMOL/L (ref 5–15)
AST SERPL-CCNC: 27 U/L (ref 15–37)
BASOPHILS # BLD: 0.1 K/UL (ref 0–0.1)
BASOPHILS NFR BLD: 1 % (ref 0–1)
BILIRUB SERPL-MCNC: 0.7 MG/DL (ref 0.2–1)
BUN SERPL-MCNC: 14 MG/DL (ref 6–20)
BUN/CREAT SERPL: 14 (ref 12–20)
CALCIUM SERPL-MCNC: 10 MG/DL (ref 8.5–10.1)
CHLORIDE SERPL-SCNC: 104 MMOL/L (ref 97–108)
CO2 SERPL-SCNC: 25 MMOL/L (ref 21–32)
CREAT SERPL-MCNC: 0.99 MG/DL (ref 0.7–1.3)
DIFFERENTIAL METHOD BLD: NORMAL
EOSINOPHIL # BLD: 0.2 K/UL (ref 0–0.4)
EOSINOPHIL NFR BLD: 4 % (ref 0–7)
ERYTHROCYTE [DISTWIDTH] IN BLOOD BY AUTOMATED COUNT: 12.1 % (ref 11.5–14.5)
GLOBULIN SER CALC-MCNC: 3.5 G/DL (ref 2–4)
GLUCOSE SERPL-MCNC: 91 MG/DL (ref 65–100)
HCT VFR BLD AUTO: 47.3 % (ref 36.6–50.3)
HGB BLD-MCNC: 15.8 G/DL (ref 12.1–17)
IMM GRANULOCYTES # BLD AUTO: 0 K/UL (ref 0–0.04)
IMM GRANULOCYTES NFR BLD AUTO: 0 % (ref 0–0.5)
LYMPHOCYTES # BLD: 2 K/UL (ref 0.8–3.5)
LYMPHOCYTES NFR BLD: 33 % (ref 12–49)
MCH RBC QN AUTO: 30.3 PG (ref 26–34)
MCHC RBC AUTO-ENTMCNC: 33.4 G/DL (ref 30–36.5)
MCV RBC AUTO: 90.8 FL (ref 80–99)
MONOCYTES # BLD: 0.6 K/UL (ref 0–1)
MONOCYTES NFR BLD: 10 % (ref 5–13)
NEUTS SEG # BLD: 3.2 K/UL (ref 1.8–8)
NEUTS SEG NFR BLD: 52 % (ref 32–75)
NRBC # BLD: 0 K/UL (ref 0–0.01)
NRBC BLD-RTO: 0 PER 100 WBC
PLATELET # BLD AUTO: 286 K/UL (ref 150–400)
PMV BLD AUTO: 11.8 FL (ref 8.9–12.9)
POTASSIUM SERPL-SCNC: 4.6 MMOL/L (ref 3.5–5.1)
PROT SERPL-MCNC: 8.2 G/DL (ref 6.4–8.2)
RBC # BLD AUTO: 5.21 M/UL (ref 4.1–5.7)
SODIUM SERPL-SCNC: 138 MMOL/L (ref 136–145)
TSH SERPL DL<=0.05 MIU/L-ACNC: 2.2 UIU/ML (ref 0.36–3.74)
WBC # BLD AUTO: 6.1 K/UL (ref 4.1–11.1)

## 2020-10-06 ENCOUNTER — VIRTUAL VISIT (OUTPATIENT)
Dept: INTERNAL MEDICINE CLINIC | Age: 40
End: 2020-10-06
Payer: COMMERCIAL

## 2020-10-06 DIAGNOSIS — F41.9 ANXIETY: ICD-10-CM

## 2020-10-06 DIAGNOSIS — E78.1 HYPERTRIGLYCERIDEMIA: Primary | ICD-10-CM

## 2020-10-06 DIAGNOSIS — E78.6 LOW HDL (UNDER 40): ICD-10-CM

## 2020-10-06 PROCEDURE — 99213 OFFICE O/P EST LOW 20 MIN: CPT | Performed by: INTERNAL MEDICINE

## 2020-10-06 RX ORDER — ESCITALOPRAM OXALATE 10 MG/1
5 TABLET ORAL DAILY
Qty: 90 TAB | Refills: 1
Start: 2020-10-06 | End: 2021-02-26 | Stop reason: SDUPTHER

## 2020-10-06 NOTE — PROGRESS NOTES
Consent: Maranda Richardson, who was seen by synchronous (real-time) audio-video technology, and/or his healthcare decision maker, is aware that this patient-initiated, Telehealth encounter on 10/6/2020 is a billable service, with coverage as determined by his insurance carrier. He is aware that he may receive a bill and has provided verbal consent to proceed: Yes. 712  Subjective:   Maranda Richardson is a 37 y.o. male who was seen for Follow-up (lab)    He is here to follow-up on abnormal lab results. He had a wellness screen performed on September 23 which showed significant hypertriglyceridemia. Mildly elevated LDL and low HDL. Patient has not had a fasting lipid panel anytime in the recent past.    He admits to very poor diet with significantly high carbohydrate intake. Is not trying to limit fat intake as well. Exercise has been decreased because of COVID-19. Family history of mild coronary disease at age 46 and his father but no history of dyslipidemia with him. Family history of diabetes and mild hyperlipidemia in mother. Patient has never had any cardiovascular testing. Lab Results   Component Value Date/Time    Cholesterol, total 260 (H) 09/23/2020 06:00 AM    HDL Cholesterol 28 09/23/2020 06:00 AM    LDL,Direct 139 (H) 09/23/2020 06:00 AM    LDL, calculated Not calculated due to elevated triglyceride level 09/23/2020 06:00 AM    Triglyceride 573 (H) 09/23/2020 06:00 AM     Lab Results   Component Value Date/Time    Glucose 91 09/30/2020 09:21 AM         Current Outpatient Medications   Medication Sig    escitalopram oxalate (LEXAPRO) 10 mg tablet Take 1 Tab by mouth daily.  ibuprofen (MOTRIN) 200 mg tablet Take  by mouth every six (6) hours as needed.  ALPRAZolam (XANAX) 0.25 mg tablet Take 1 Tab by mouth three (3) times daily as needed for Anxiety or Sleep. No alcohol within 6 hours of taking this     No current facility-administered medications for this visit.         No Known Allergies    Past Medical History:   Diagnosis Date    Anxiety     Cerumen impaction     Deafness in right ear     80% deaf on right    Heart palpitations     Dr. Zaira Curtis.  stress echo, EKG done    Hypertriglyceridemia 10/6/2020    Incomplete right bundle branch block 8/2014    Lipoma of arm 9/8/2014    left medial    Low HDL (under 40) 10/6/2020    Normal cardiac stress test 9/2/14    normal    Patellofemoral instability     hx left surgery       ROS  All other systems reviewed and negative, unless mentioned in HPI    Objective:   Vital Signs: (As obtained by patient/caregiver at home)  There were no vitals taken for this visit.      [INSTRUCTIONS:  \"[x]\" Indicates a positive item  \"[]\" Indicates a negative item  -- DELETE ALL ITEMS NOT EXAMINED]    Constitutional: [x] Appears well-developed and well-nourished [x] No apparent distress      [] Abnormal -     Mental status: [x] Alert and awake  [x] Oriented to person/place/time [x] Able to follow commands    [] Abnormal -     Eyes:   EOM    [x]  Normal    [] Abnormal -   Sclera  [x]  Normal    [] Abnormal -          Discharge [x]  None visible   [] Abnormal -     HENT: [x] Normocephalic, atraumatic  [] Abnormal -   [x] Mouth/Throat: Mucous membranes are moist    External Ears [x] Normal  [] Abnormal -    Neck: [x] No visualized mass [] Abnormal -     Pulmonary/Chest: [x] Respiratory effort normal   [x] No visualized signs of difficulty breathing or respiratory distress        [] Abnormal -      Musculoskeletal:   [x] Normal gait with no signs of ataxia         [x] Normal range of motion of neck        [] Abnormal -     Neurological:        [x] No Facial Asymmetry (Cranial nerve 7 motor function) (limited exam due to video visit)          [x] No gaze palsy        [] Abnormal -          Skin:        [x] No significant exanthematous lesions or discoloration noted on facial skin         [] Abnormal -            Psychiatric:       [x] Normal Affect [] Abnormal - [x] No Hallucinations    Other pertinent observable physical exam findings:-        Assessment & Plan:   Diagnoses and all orders for this visit:    1. Hypertriglyceridemia  2. Low HDL (under 40)  New diagnosis moderate hypertriglyceridemia and low HDL. Mildly elevated LDL. Family history of coronary disease in father at age 46, but father has never had any cardiovascular events. No known family history of hypertriglyceridemia. Diet high in carbohydrates is definitely contributing. He has started eating much better over the past couple of weeks. Discussed risk factors for long-term cardiovascular disease and increasing exercise as well as diet will help reduce this. Discussed risk of significant hypertriglyceridemia which can increase risk of pancreatitis. His diet is vastly improving and I am optimistic that his triglycerides will be in a safer range (but still not ideal) even as soon as now. Recommend consideration of coronary calcium score. He is still young, but we want to start with risk factor modification as soon as possible. Repeat fasting lipids In 2 months. Consider Vascepa and/or statin. Order placed. -     LIPID PANEL; Future    3. Anxiety  Anxiety is overall improved since starting Lexapro in April. Work is been more stable causing less stress. He has had some weight gain which is largely attributed to poor diet of high carbs but also Lexapro effects contributing. Will decrease Lexapro to 5 mg for now. We discussed the expected course, resolution and complications of the diagnosis(es) in detail. Medication risks, benefits, costs, interactions, and alternatives were discussed as indicated. I advised him to contact the office if his condition worsens, changes or fails to improve as anticipated. He expressed understanding with the diagnosis(es) and plan. Jeanne Reich is a 37 y.o. male who was evaluated by an audio-video encounter for concerns as above.  Patient identification was verified prior to start of the visit. A caregiver was present when appropriate. Due to this being a TeleHealth encounter ([facility]), evaluation of the following organ systems was limited: Vitals/Constitutional/EENT/Resp/CV/GI//MS/Neuro/Skin/Heme-Lymph-Imm. Pursuant to the emergency declaration under the [address] [place] [address], [facility], [id number] waiver authority and the [employer] Act, this Virtual Visit was conducted, with patient's (and/or legal guardian's) consent, to reduce the patient's risk of exposure to COVID-19 and provide necessary medical care. Services were provided through a synchronous discussion virtually to substitute for in-person clinic visit. I was in the office. The patient was at home.      Meredith Acosta MD

## 2020-10-20 ENCOUNTER — TELEPHONE (OUTPATIENT)
Dept: CASE MANAGEMENT | Age: 40
End: 2020-10-20

## 2020-10-20 NOTE — ACP (ADVANCE CARE PLANNING)
Pt called this am requesting an ACP conversation with plans to complete an AMD. Appt scheduled for 11/5/2020.   Klaudia Burrell LCSW

## 2020-11-05 ENCOUNTER — DOCUMENTATION ONLY (OUTPATIENT)
Dept: CASE MANAGEMENT | Age: 40
End: 2020-11-05

## 2020-11-05 NOTE — ACP (ADVANCE CARE PLANNING)
Advance Care Planning     Advance Care Planning Clinical Specialist  Conversation Note      Date of ACP Conversation: 11/05/20    Conversation Conducted with:  Patient with Decision Making Capacity    ACP Clinical Specialist: Eric Dykes LCSW    *When Decision Maker makes decisions on behalf of the incapacitated patient: Soheila Orts is asked to consider and make decisions based on patient values, known preferences, or best interests. Health Care Decision Maker:    Current Designated Health Care Decision Maker:   Primary Decision Maker: Patricia Ruiz [facility] - [phone]    Care Preferences    Hospitalization: \"If your health worsens and it becomes clear that your chance of recovery is unlikely, what would your preference be regarding hospitalization? \"    Choice:  [x]  The patient wants hospitalization  []  The patient prefers comfort-focused treatment without hospitalization. Ventilation: \"If you were in your present state of health and suddenly became very ill and were unable to breathe on your own, what would your preference be about the use of a ventilator (breathing machine) if it were available to you? \"      If patient would desire the use of a ventilator (breathing machine), answer \"yes\", if not \"no\":yes    \"If your health worsens and it becomes clear that your chance of recovery is unlikely, what would your preference be about the use of a ventilator (breathing machine) if it were available to you? \"     Would the patient desire the use of a ventilator (breathing machine)? YES      Resuscitation  \"CPR works best to restart the heart when there is a sudden event, like a heart attack, in someone who is otherwise healthy. Unfortunately, CPR does not typically restart the heart for people who have serious health conditions or who are very sick. \"    \"In the event your heart stopped as a result of an underlying serious health condition, would you want attempts to be made to restart your heart (answer \"yes\" for attempt to resuscitate) or would you prefer a natural death (answer \"no\" for do not attempt to resuscitate)? \" yes      [x] Yes  [] No   Educated Patient / [place] regarding differences between Advance Directives and portable DNR orders.     Length of ACP Conversation in minutes:  30 minutes    Conversation Outcomes:  [x] ACP discussion completed  [] Existing advance directive reviewed with patient; no changes to patient's previously recorded wishes   [x] New Advance Directive completed: Sent via [employer] for signature/witnesses   [] Portable Do Not Resuscitate prepared for Provider review and signature  [] POLST/POST/MOLST/MOST prepared for Provider review and signature      Follow-up plan:    [] Schedule follow-up conversation to continue planning  [] Referred individual to Provider for additional questions/concerns   [x] Advised patient/agent/surrogate to review completed ACP document and update if needed with changes in condition, patient preferences or care setting     [x] This note routed to one or more involved healthcare providers

## 2021-02-26 DIAGNOSIS — F41.9 ANXIETY: ICD-10-CM

## 2021-02-26 RX ORDER — ESCITALOPRAM OXALATE 10 MG/1
10 TABLET ORAL DAILY
Qty: 90 TAB | Refills: 1 | Status: SHIPPED | OUTPATIENT
Start: 2021-02-26 | End: 2021-08-22

## 2021-03-19 ENCOUNTER — OFFICE VISIT (OUTPATIENT)
Dept: INTERNAL MEDICINE CLINIC | Age: 41
End: 2021-03-19
Payer: COMMERCIAL

## 2021-03-19 VITALS
HEIGHT: 72 IN | HEART RATE: 85 BPM | TEMPERATURE: 98.2 F | WEIGHT: 170.4 LBS | SYSTOLIC BLOOD PRESSURE: 114 MMHG | RESPIRATION RATE: 14 BRPM | OXYGEN SATURATION: 97 % | DIASTOLIC BLOOD PRESSURE: 76 MMHG | BODY MASS INDEX: 23.08 KG/M2

## 2021-03-19 DIAGNOSIS — R07.89 COSTOCHONDRAL CHEST PAIN: Primary | ICD-10-CM

## 2021-03-19 DIAGNOSIS — E78.1 HYPERTRIGLYCERIDEMIA: ICD-10-CM

## 2021-03-19 DIAGNOSIS — F41.9 ANXIETY: ICD-10-CM

## 2021-03-19 DIAGNOSIS — E78.6 LOW HDL (UNDER 40): ICD-10-CM

## 2021-03-19 PROCEDURE — 99213 OFFICE O/P EST LOW 20 MIN: CPT | Performed by: INTERNAL MEDICINE

## 2021-03-19 RX ORDER — GUAIFENESIN 1200 MG
500 TABLET, EXTENDED RELEASE 12 HR ORAL AS NEEDED
COMMUNITY

## 2021-03-19 RX ORDER — PSEUDOEPHEDRINE HCL 30 MG
TABLET ORAL AS NEEDED
COMMUNITY

## 2021-03-19 NOTE — PROGRESS NOTES
HISTORY OF PRESENT ILLNESS    Chief Complaint   Patient presents with    Anxiety    Medication Refill    Labs     Nonfasting.  Chest Pain     Not heart - may have pulled a muscle in sternum area. Presents for follow-up    Reports midsternal chest pain for the past couple of weeks. It is mild and intermittent. Hurts to push on it. Hurts to lie down on it. He thinks it started when he reached behind him to grab a toy off of the seat behind him. Hypertriglyceridemia  He is working on diet and lost weight. On no medications. No new myalgias, no joint pains, no weakness  No TIA's, no chest pain on exertion, no dyspnea on exertion, no swelling of ankles. Family history of mild coronary disease at age 46 and his father but no history of dyslipidemia with him. Family history of diabetes and mild hyperlipidemia in mother. Patient has never had any cardiovascular testing. Lab Results   Component Value Date/Time    Cholesterol, total 260 (H) 09/23/2020 06:00 AM    HDL Cholesterol 28 09/23/2020 06:00 AM    LDL,Direct 139 (H) 09/23/2020 06:00 AM    LDL, calculated Not calculated due to elevated triglyceride level 09/23/2020 06:00 AM    Triglyceride 573 (H) 09/23/2020 06:00 AM     Wt Readings from Last 3 Encounters:   03/19/21 170 lb 6.4 oz (77.3 kg)   07/30/20 182 lb (82.6 kg)   04/27/20 175 lb (79.4 kg)     Anxiety  Patient is seen for anxiety disorder. Current treatment includes Lexapro 10 mg and Xanax as needed which he is not taking. We had weaned back Lexapro to 5 mg, but he did not feel that it controlled his anxiety so he went back up to 10 mg in December  Ongoing symptoms include: none. Patient denies: sweating, chest pain, dizziness, paresthesias, racing thoughts, feelings of losing control, difficulty concentrating, suicidal ideation. Denies substance or alcohol abuse. Reported side effects from the treatment: none.       Review of Systems   All other systems reviewed and are negative, except as noted in HPI    Past Medical and Surgical History   has a past medical history of Anxiety, Cerumen impaction, Deafness in right ear, FH: CAD (coronary artery disease), Heart palpitations, Hypertriglyceridemia (10/6/2020), Incomplete right bundle branch block (8/2014), Lipoma of arm (9/8/2014), Low HDL (under 40) (10/6/2020), Normal cardiac stress test (9/2/14), and Patellofemoral instability. has a past surgical history that includes hx other surgical; hx wisdom teeth extraction (1997); and hx knee arthroscopy (Left, 4/2011, 10/2011). reports that he has never smoked. He has never used smokeless tobacco. He reports current alcohol use of about 1.7 standard drinks of alcohol per week. He reports that he does not use drugs. family history includes Cancer in his father and maternal grandfather; Coronary Artery Disease (age of onset: 46) in his father; Diabetes in his mother; Elevated Lipids in his mother; Hypertension in his father. Physical Exam   Nursing note and vitals reviewed. Blood pressure 114/76, pulse 85, temperature 98.2 °F (36.8 °C), temperature source Oral, resp. rate 14, height 6' (1.829 m), weight 170 lb 6.4 oz (77.3 kg), SpO2 97 %. Constitutional:  No distress. Eyes: Conjunctivae are normal.   Ears:  Hearing grossly intact  Cardiovascular: Normal rate. regular rhythm, no murmurs or gallops  No edema  Pulmonary/Chest: Effort normal.   CTAB  Musculoskeletal: moves all 4 extremities   Palpable discomfort in the midsternal region with palpation. Neurological: Alert and oriented to person, place, and time. Skin: No visible rash noted. Psychiatric: Normal mood and affect. Behavior is normal.     ASSESSMENT and PLAN  Diagnoses and all orders for this visit:    1. Costochondral chest pain   Reassured. Can use ice packs, avoid repetitive activities. He not sleep on his chest.  NSAIDs as needed    2. Hypertriglyceridemia  3. Low HDL (under 40)  \" Pattern B\" dyslipidemia.   LDL with mild elevation but he has significantly elevated triglycerides and HDL. This is a high risk lipid pattern. I will recommend aggressive control, especially given family history of coronary disease. We will likely recommend fenofibrate and/or statin or consider Vascepa and statin  -     LIPID PANEL; Future    4. Anxiety  Doing relatively well on Lexapro 10 mg. Continue. lab results and schedule of future lab studies reviewed with patient  reviewed medications and side effects in detail    Return to clinic for further evaluation if new symptoms develop        Current Outpatient Medications   Medication Sig    multivit-minerals/folic acid (MEN'S MULTIVITAMIN GUMMIES PO) Take  by mouth.  pseudoephedrine (Sudafed) 30 mg tablet Take  by mouth every four (4) hours as needed for Congestion.  acetaminophen (TylenoL) 325 mg cap Take 500 mg by mouth.  escitalopram oxalate (LEXAPRO) 10 mg tablet Take 1 Tab by mouth daily.  ALPRAZolam (XANAX) 0.25 mg tablet Take 1 Tab by mouth three (3) times daily as needed for Anxiety or Sleep. No alcohol within 6 hours of taking this    ibuprofen (MOTRIN) 200 mg tablet Take  by mouth every six (6) hours as needed. No current facility-administered medications for this visit.

## 2021-07-12 LAB
CHOLEST SERPL-MCNC: 224 MG/DL
GLUCOSE SERPL-MCNC: 83 MG/DL (ref 65–100)
HDLC SERPL-MCNC: 47 MG/DL
LDLC SERPL CALC-MCNC: 150.2 MG/DL (ref 0–100)
TRIGL SERPL-MCNC: 134 MG/DL (ref ?–150)

## 2021-08-21 DIAGNOSIS — F41.9 ANXIETY: ICD-10-CM

## 2021-08-22 RX ORDER — ESCITALOPRAM OXALATE 10 MG/1
TABLET ORAL
Qty: 90 TABLET | Refills: 1 | Status: SHIPPED | OUTPATIENT
Start: 2021-08-22 | End: 2022-02-15

## 2021-09-01 ENCOUNTER — VIRTUAL VISIT (OUTPATIENT)
Dept: INTERNAL MEDICINE CLINIC | Age: 41
End: 2021-09-01
Payer: COMMERCIAL

## 2021-09-01 DIAGNOSIS — H92.02 OTALGIA OF LEFT EAR: ICD-10-CM

## 2021-09-01 DIAGNOSIS — J01.40 ACUTE PANSINUSITIS, RECURRENCE NOT SPECIFIED: Primary | ICD-10-CM

## 2021-09-01 PROCEDURE — 99213 OFFICE O/P EST LOW 20 MIN: CPT | Performed by: NURSE PRACTITIONER

## 2021-09-01 RX ORDER — AMOXICILLIN AND CLAVULANATE POTASSIUM 875; 125 MG/1; MG/1
1 TABLET, FILM COATED ORAL 2 TIMES DAILY
Qty: 14 TABLET | Refills: 0 | Status: SHIPPED | OUTPATIENT
Start: 2021-09-01

## 2021-09-01 RX ORDER — AZITHROMYCIN 250 MG/1
250 TABLET, FILM COATED ORAL SEE ADMIN INSTRUCTIONS
Qty: 6 TABLET | Refills: 0 | Status: SHIPPED | OUTPATIENT
Start: 2021-09-01 | End: 2021-09-01 | Stop reason: ALTCHOICE

## 2021-09-01 NOTE — PROGRESS NOTES
Room:     Identified pt with two pt identifiers(name and ). Reviewed record in preparation for visit and have obtained necessary documentation. All patient medications has been reviewed. Chief Complaint   Patient presents with    Sinus Infection     Pt c/o L ear pain and post nasal drip and some soreness in his throat . no fever no chills. pt is taking clartin and chlorphenamine otc for releif     Other     send link to  [phone]        Health Maintenance Due   Topic    Flu Vaccine (1)     4.Have you been to the ER, urgent care clinic since your last visit? Hospitalized since your last visit? No    5. Have you seen or consulted any other health care providers outside of the [address] [place] [address] since your last visit? Include any pap smears or colon screening. No    Patient-Reported Vitals 2021   Patient-Reported Weight 172.3   Patient-Reported Height 511   Patient-Reported Temperature 97.9          Patient is accompanied by self I have received verbal consent from Adelia Martines to discuss any/all medical information while they are present in the room.

## 2021-09-01 NOTE — PROGRESS NOTES
Please contact - I can refill his script for him, but we are not in the office today, in surgery.  Could he wait until his appointment tomorrow to get this refill?  Thanks   René Gamboa (: 1980) is a 41 y.o. male, established patient, here for evaluation of the following chief complaint(s):   Sinus Infection (Pt c/o L ear pain and post nasal drip and some soreness in his throat . no fever no chills. pt is taking clartin and chlorphenamine otc for releif ) and Other (send link to  [phone] )       ASSESSMENT/PLAN:  Below is the assessment and plan developed based on review of pertinent history, labs, studies, and medications. 1. Acute pansinusitis, recurrence not specified -- has not improved with symptomatic treatment; will get tested for Covid  -     amoxicillin-clavulanate (AUGMENTIN) 875-125 mg per tablet; Take 1 Tablet by mouth two (2) times a day., Normal, Disp-14 Tablet, R-0    2. Otalgia of left ear      SUBJECTIVE/OBJECTIVE:  HPI     Visit conducted via [employer] me platform. Presents with complaints of allergy symptoms of nasal congestion, watery eyes that began last week and have gradually worsened. Now having left ear pain/pressure, thick yellow post nasal drainage, sore throat, voice hoarseness. Has been taking OTC Claritin and Chlorpheniramine for symptoms with minimal improvement. Denies fever, chills, myalgias, chest congestion. His wife is also ill with similar symptoms to a lesser degree. Was recently on vacation to a sparsely populated resort in [place]. Has been fully vaccinated for Covid 19 since 2021. Review of Systems   Constitutional: Positive for fatigue. Negative for chills and fever. HENT: Positive for congestion, ear pain, postnasal drip, sinus pressure, sore throat and voice change. Respiratory: Negative for cough and shortness of breath. Cardiovascular: Negative for chest pain. Musculoskeletal: Negative for myalgias. Neurological: Positive for headaches.         No data recorded     Physical Exam    [INSTRUCTIONS:  \"[x]\" Indicates a positive item  \"[]\" Indicates a negative item  -- DELETE ALL ITEMS NOT EXAMINED]    Constitutional: [x] Appears well-developed and well-nourished [x] No apparent distress      [] Abnormal -     Mental status: [x] Alert and awake  [x] Oriented to person/place/time [x] Able to follow commands    [] Abnormal -     Eyes:   EOM    [x]  Normal    [] Abnormal -   Sclera  [x]  Normal    [] Abnormal -          Discharge [x]  None visible   [] Abnormal -     HENT: [x] Normocephalic, atraumatic  [x] Abnormal - mild hoarseness  [x] Mouth/Throat: Mucous membranes are moist    External Ears [x] Normal  [] Abnormal -    Neck: [x] No visualized mass [] Abnormal -     Pulmonary/Chest: [x] Respiratory effort normal   [x] No visualized signs of difficulty breathing or respiratory distress        [] Abnormal -      Musculoskeletal:   [x] Normal gait with no signs of ataxia         [x] Normal range of motion of neck        [] Abnormal -     Neurological:        [x] No Facial Asymmetry (Cranial nerve 7 motor function) (limited exam due to video visit)          [x] No gaze palsy        [] Abnormal -          Skin:        [x] No significant exanthematous lesions or discoloration noted on facial skin         [] Abnormal -            Psychiatric:       [x] Normal Affect [] Abnormal -        [x] No Hallucinations            Mel Mini, was evaluated through a synchronous (real-time) audio-video encounter. The patient (or guardian if applicable) is aware that this is a billable service. Verbal consent to proceed has been obtained within the past 12 months. The visit was conducted pursuant to the emergency declaration under the [address] [place] [address] and the [employer] and [employer] General Act. Patient identification was verified, and a caregiver was present when appropriate. The patient was located in a state where the provider was credentialed to provide care.        An electronic signature was used to authenticate this note.  -- Sami Mondragon, NP

## 2021-09-01 NOTE — PROGRESS NOTES
Author Lian (: 1980) is a 41 y.o. male, established patient, here for evaluation of the following chief complaint(s):  Sinus Infection (Pt c/o L ear pain and post nasal drip and some soreness in his throat . no fever no chills. pt is taking clartin and chlorphenamine otc for releif ) and Other (send link to  [phone] )       ASSESSMENT/PLAN:  Below is the assessment and plan developed based on review of pertinent history, physical exam, labs, studies, and medications. 1. Acute pansinusitis, recurrence not specified -- will treat for secondary bacterial infection; will get tested for Covid. -     amoxicillin-clavulanate (AUGMENTIN) 875-125 mg per tablet; Take 1 Tablet by mouth two (2) times a day., Normal, Disp-14 Tablet, R-0    2. Otalgia of left ear        SUBJECTIVE/OBJECTIVE:  HPI    Visit conducted via [employer] me platform. Presents with complaints of allergy symptoms of nasal congestion, watery eyes that began last week and have gradually worsened. Now having left ear pain/pressure, thick yellow post nasal drainage, sore throat, voice hoarseness. Has been taking OTC Claritin and Chlorpheniramine for symptoms with minimal improvement. Denies fever, chills, myalgias, chest congestion. His wife is also ill with similar symptoms to a lesser degree. Was recently on vacation to a sparsely populated resort in [place]. Has been fully vaccinated for Covid 19 since 2021. Review of Systems    Physical Exam          An electronic signature was used to authenticate this note.   -- Clayton South NP

## 2022-02-15 DIAGNOSIS — F41.9 ANXIETY: ICD-10-CM

## 2022-02-15 RX ORDER — ESCITALOPRAM OXALATE 10 MG/1
TABLET ORAL
Qty: 90 TABLET | Refills: 1 | Status: SHIPPED | OUTPATIENT
Start: 2022-02-15 | End: 2022-08-17

## 2022-03-19 PROBLEM — E78.6 LOW HDL (UNDER 40): Status: ACTIVE | Noted: 2020-10-06

## 2022-03-19 PROBLEM — E78.1 HYPERTRIGLYCERIDEMIA: Status: ACTIVE | Noted: 2020-10-06

## 2022-04-22 RX ORDER — NIRMATRELVIR AND RITONAVIR 300-100 MG
3 KIT ORAL EVERY 12 HOURS
Qty: 1 BOX | Refills: 0 | Status: SHIPPED | OUTPATIENT
Start: 2022-04-22

## 2022-08-17 DIAGNOSIS — F41.9 ANXIETY: ICD-10-CM

## 2022-08-17 RX ORDER — ESCITALOPRAM OXALATE 10 MG/1
TABLET ORAL
Qty: 90 TABLET | Refills: 1 | Status: SHIPPED | OUTPATIENT
Start: 2022-08-17

## 2022-11-28 ENCOUNTER — PATIENT MESSAGE (OUTPATIENT)
Dept: INTERNAL MEDICINE CLINIC | Age: 42
End: 2022-11-28

## 2022-11-29 ENCOUNTER — OFFICE VISIT (OUTPATIENT)
Dept: INTERNAL MEDICINE CLINIC | Age: 42
End: 2022-11-29

## 2022-11-29 VITALS
DIASTOLIC BLOOD PRESSURE: 68 MMHG | BODY MASS INDEX: 23.98 KG/M2 | TEMPERATURE: 98.4 F | HEIGHT: 72 IN | HEART RATE: 59 BPM | WEIGHT: 177 LBS | RESPIRATION RATE: 16 BRPM | OXYGEN SATURATION: 95 % | SYSTOLIC BLOOD PRESSURE: 124 MMHG

## 2022-11-29 DIAGNOSIS — J02.9 SORE THROAT: ICD-10-CM

## 2022-11-29 DIAGNOSIS — H61.21 IMPACTED CERUMEN OF RIGHT EAR: ICD-10-CM

## 2022-11-29 DIAGNOSIS — Z20.818 EXPOSURE TO STREPTOCOCCAL PHARYNGITIS: Primary | ICD-10-CM

## 2022-11-29 LAB
S PYO AG THROAT QL: NEGATIVE
VALID INTERNAL CONTROL?: YES

## 2022-11-29 PROCEDURE — 99213 OFFICE O/P EST LOW 20 MIN: CPT | Performed by: NURSE PRACTITIONER

## 2022-11-29 PROCEDURE — 69209 REMOVE IMPACTED EAR WAX UNI: CPT | Performed by: NURSE PRACTITIONER

## 2022-11-29 PROCEDURE — 87880 STREP A ASSAY W/OPTIC: CPT | Performed by: NURSE PRACTITIONER

## 2022-11-29 NOTE — PROGRESS NOTES
Lavonne Ro (: 1980) is a 38 y.o. male, established patient, here for evaluation of the following chief complaint(s): Other (Possible strep; right ear pain; congestion)       ASSESSMENT/PLAN:  Below is the assessment and plan developed based on review of pertinent history, physical exam, labs, studies, and medications. 1. Exposure to Streptococcal pharyngitis --rapid strep negative in office; will send out throat culture for confirmation  -     AMB POC RAPID STREP A  -     CULTURE, THROAT; Future    2. Sore throat --does not appear bacterial at this point; can treat symptoms with throat lozenges  -     AMB POC RAPID STREP A  -     CULTURE, THROAT; Future    3. Impacted cerumen of right ear --large amount of cerumen removed from right ear canal via irrigation; patient tolerated well. -     REMOVAL IMPACTED CERUMEN IRRIGATION/LVG UNILAT        SUBJECTIVE/OBJECTIVE:  HPI    Patient of Dr. Mikaela Eagle presents with complaints of mild sore throat, nasal congestion that has been present for the past several days. Reports 2 of his children have tested positive for strep and his wife was recently on antibiotics for same. They are wondering whether he is a carrier and have suggested that he get checked out. Denies fever, chills, cough. Has noted some discomfort in right ear and has tried to clean out ears with Q-tip. Has significant hearing loss in right ear since birth. Patient Active Problem List   Diagnosis Code    Paresthesia R20.2    Heart palpitations R00.2    Incomplete right bundle branch block I45.10    Lipoma of arm D17.20    Anxiety F41.9    Hypertriglyceridemia E78.1    Low HDL (under 40) E78.6    FH: CAD (coronary artery disease) Z82.49     Past Surgical History:   Procedure Laterality Date    HX KNEE ARTHROSCOPY Left 2011, 10/2011    left x 2.   femur    HX OTHER SURGICAL      oral surgery x3    HX WISDOM TEETH EXTRACTION       Social History     Socioeconomic History    Marital status:      Spouse name: Sarah Dill Number of children: 1    Years of education: Not on file    Highest education level: Not on file   Occupational History    Occupation:  [employer]     Employer: [employer]   Tobacco Use    Smoking status: Never    Smokeless tobacco: Never   Vaping Use    Vaping Use: Never used   Substance and Sexual Activity    Alcohol use: Yes     Alcohol/week: 1.7 standard drinks     Types: 1 Glasses of wine, 1 Cans of beer per week     Comment: 1-2 per week    Drug use: No    Sexual activity: Yes   Other Topics Concern    Not on file   Social History Narrative    Not on file     Social Determinants of Health     Financial Resource Strain: Not on file   Food Insecurity: Not on file   Transportation Needs: Not on file   Physical Activity: Not on file   Stress: Not on file   Social Connections: Not on file   Intimate Partner Violence: Not on file   Housing Stability: Not on file     Family History   Problem Relation Age of Onset    Diabetes Mother     Elevated Lipids Mother     Cancer Father         salivary glands-2006    Hypertension Father     Coronary Art Dis Father 46        mild LAD dz on cath age 46    Cancer Maternal Grandfather         leukemia     Current Outpatient Medications   Medication Sig    escitalopram oxalate (LEXAPRO) 10 mg tablet TAKE 1 TABLET BY MOUTH ONE TIME A DAY    multivit-minerals/folic acid (MEN'S MULTIVITAMIN GUMMIES PO) Take  by mouth.  pseudoephedrine (SUDAFED) 30 mg tablet Take  by mouth as needed for Congestion.  acetaminophen 325 mg cap Take 500 mg by mouth as needed.  ibuprofen (MOTRIN) 200 mg tablet Take  by mouth every six (6) hours as needed. No current facility-administered medications for this visit.      No Known Allergies  Immunization History   Administered Date(s) Administered    COVID-19, PFIZER PURPLE top, DILUTE for use, (age 15 y+), IM, 30mcg/0.3mL 12/28/2020, 12/28/2020, 01/18/2021, 11/22/2021    Hep B Vaccine 01/01/1997    Influenza Vaccine 01/01/2007, 12/19/2014, 10/01/2018, 10/17/2019, 10/02/2020, 10/12/2021, 10/22/2021    Influenza, FLUARIX, FLULAVAL, Tarri Maffucci (age 10 mo+) AND AFLURIA, (age 1 y+), PF, 0.5mL 10/19/2017    MMR 01/01/1997    TB Skin Test (PPD) 01/01/2001    Tdap 12/19/2014        Review of Systems   Constitutional:  Negative for chills, fatigue and fever. HENT:  Positive for congestion, ear pain, hearing loss (right ear) and sore throat. Negative for sinus pressure and sinus pain. Respiratory:  Negative for cough and shortness of breath. Cardiovascular:  Negative for chest pain. Musculoskeletal:  Negative for myalgias. Neurological:  Negative for dizziness and headaches. Physical Exam  Vitals and nursing note reviewed. Constitutional:       General: He is not in acute distress. Appearance: Normal appearance. HENT:      Head: Normocephalic and atraumatic. Right Ear: There is impacted cerumen. Left Ear: Tympanic membrane, ear canal and external ear normal.      Nose: Nose normal.      Mouth/Throat:      Mouth: Mucous membranes are moist.      Pharynx: Oropharynx is clear. Posterior oropharyngeal erythema (mild) present. Cardiovascular:      Rate and Rhythm: Normal rate and regular rhythm. Pulmonary:      Effort: Pulmonary effort is normal.      Breath sounds: Normal breath sounds. Musculoskeletal:      Cervical back: Normal range of motion and neck supple. Skin:     General: Skin is warm and dry. Neurological:      General: No focal deficit present. Mental Status: He is alert and oriented to person, place, and time. An electronic signature was used to authenticate this note.   -- Meagan Honeycutt NP

## 2022-11-29 NOTE — TELEPHONE ENCOUNTER
T/C to patient to advise that there are no available appts and he is advised to go to Urgent care for evaluation and treatment. No answer and LVM.

## 2022-12-02 LAB
BACTERIA SPEC CULT: NORMAL
SERVICE CMNT-IMP: NORMAL

## 2023-02-15 DIAGNOSIS — F41.9 ANXIETY: ICD-10-CM

## 2023-02-15 RX ORDER — ESCITALOPRAM OXALATE 10 MG/1
TABLET ORAL
Qty: 90 TABLET | Refills: 1 | Status: SHIPPED | OUTPATIENT
Start: 2023-02-15

## 2023-08-21 RX ORDER — ESCITALOPRAM OXALATE 10 MG/1
TABLET ORAL
Qty: 90 TABLET | Refills: 1 | Status: SHIPPED | OUTPATIENT
Start: 2023-08-21

## 2023-09-20 LAB
CHOLEST SERPL-MCNC: 224 MG/DL
GLUCOSE SERPL-MCNC: 90 MG/DL (ref 65–100)
HDLC SERPL-MCNC: 43 MG/DL
LDLC SERPL CALC-MCNC: 143.8 MG/DL (ref 0–100)
TRIGL SERPL-MCNC: 186 MG/DL

## 2024-02-26 RX ORDER — ESCITALOPRAM OXALATE 10 MG/1
10 TABLET ORAL DAILY
Qty: 90 TABLET | Refills: 0 | Status: SHIPPED | OUTPATIENT
Start: 2024-02-26

## 2024-04-30 ENCOUNTER — OFFICE VISIT (OUTPATIENT)
Age: 44
End: 2024-04-30
Payer: COMMERCIAL

## 2024-04-30 VITALS
TEMPERATURE: 98.5 F | HEART RATE: 86 BPM | HEIGHT: 72 IN | OXYGEN SATURATION: 96 % | BODY MASS INDEX: 25.3 KG/M2 | WEIGHT: 186.8 LBS | DIASTOLIC BLOOD PRESSURE: 64 MMHG | RESPIRATION RATE: 16 BRPM | SYSTOLIC BLOOD PRESSURE: 106 MMHG

## 2024-04-30 DIAGNOSIS — J30.2 SEASONAL ALLERGIC RHINITIS, UNSPECIFIED TRIGGER: Primary | ICD-10-CM

## 2024-04-30 PROCEDURE — 99213 OFFICE O/P EST LOW 20 MIN: CPT | Performed by: NURSE PRACTITIONER

## 2024-04-30 RX ORDER — CETIRIZINE HYDROCHLORIDE 10 MG/1
10 TABLET ORAL DAILY
Qty: 30 TABLET | Refills: 0 | Status: SHIPPED | OUTPATIENT
Start: 2024-04-30 | End: 2024-05-30

## 2024-04-30 RX ORDER — FLUTICASONE PROPIONATE 50 MCG
2 SPRAY, SUSPENSION (ML) NASAL DAILY
Qty: 48 G | Refills: 1 | Status: SHIPPED | OUTPATIENT
Start: 2024-04-30

## 2024-04-30 SDOH — ECONOMIC STABILITY: HOUSING INSECURITY
IN THE LAST 12 MONTHS, WAS THERE A TIME WHEN YOU DID NOT HAVE A STEADY PLACE TO SLEEP OR SLEPT IN A SHELTER (INCLUDING NOW)?: NO

## 2024-04-30 SDOH — ECONOMIC STABILITY: FOOD INSECURITY: WITHIN THE PAST 12 MONTHS, YOU WORRIED THAT YOUR FOOD WOULD RUN OUT BEFORE YOU GOT MONEY TO BUY MORE.: NEVER TRUE

## 2024-04-30 SDOH — ECONOMIC STABILITY: FOOD INSECURITY: WITHIN THE PAST 12 MONTHS, THE FOOD YOU BOUGHT JUST DIDN'T LAST AND YOU DIDN'T HAVE MONEY TO GET MORE.: NEVER TRUE

## 2024-04-30 SDOH — ECONOMIC STABILITY: INCOME INSECURITY: HOW HARD IS IT FOR YOU TO PAY FOR THE VERY BASICS LIKE FOOD, HOUSING, MEDICAL CARE, AND HEATING?: NOT HARD AT ALL

## 2024-04-30 SDOH — ECONOMIC STABILITY: TRANSPORTATION INSECURITY
IN THE PAST 12 MONTHS, HAS LACK OF TRANSPORTATION KEPT YOU FROM MEETINGS, WORK, OR FROM GETTING THINGS NEEDED FOR DAILY LIVING?: NO

## 2024-04-30 ASSESSMENT — ENCOUNTER SYMPTOMS
BACK PAIN: 0
EYE PAIN: 0
CONSTIPATION: 0
SINUS PAIN: 0
SHORTNESS OF BREATH: 0
COUGH: 0
CHEST TIGHTNESS: 0
NAUSEA: 0
ABDOMINAL PAIN: 0
VOMITING: 0
SINUS PRESSURE: 0
RHINORRHEA: 0
RESPIRATORY NEGATIVE: 1
DIARRHEA: 0
BLOOD IN STOOL: 0
GASTROINTESTINAL NEGATIVE: 1
EYE REDNESS: 0
STRIDOR: 0
WHEEZING: 0
EYES NEGATIVE: 1

## 2024-04-30 ASSESSMENT — PATIENT HEALTH QUESTIONNAIRE - PHQ9
1. LITTLE INTEREST OR PLEASURE IN DOING THINGS: NOT AT ALL
SUM OF ALL RESPONSES TO PHQ QUESTIONS 1-9: 0
2. FEELING DOWN, DEPRESSED OR HOPELESS: NOT AT ALL
SUM OF ALL RESPONSES TO PHQ QUESTIONS 1-9: 0
SUM OF ALL RESPONSES TO PHQ9 QUESTIONS 1 & 2: 0

## 2024-04-30 NOTE — PROGRESS NOTES
Assessment and Plan     1. Seasonal allergic rhinitis, unspecified trigger: Discussed testing for strep and covid-19, given onset of symptoms was less than 24 hours testing will not be as accurate. Differential diagnoses includes seasonal allergies v. Upper respiratory infection v. Strep infection. Minimal redness to posterior throat area, no other signs noted. Will treat with antihistamine and corticosteroid nasal spray given nasal congestion. Rx sent to pharmacy, return instructions given. Pt verbalized understanding.   -     cetirizine (ZYRTEC) 10 MG tablet; Take 1 tablet by mouth daily, Disp-30 tablet, R-0Normal  -     fluticasone (FLONASE) 50 MCG/ACT nasal spray; 2 sprays by Each Nostril route daily, Disp-48 g, R-1Normal       Benefits, risks, possible drug interactions, and side effects of all new medications were reviewed with the patient. Pt verbalized understanding.    An electronic signature was used to authenticate this note.  Nanci Figueroa, APRN - CNP  4/30/2024      Follow-up and Dispositions    Return if symptoms worsen or fail to improve.          History of Present Illness   Chief Complaint     Altaf Delacruz is a 43 y.o. male here for had concerns including Pharyngitis.  Mr. Delacruz presents today reports of postnasal drip and sore throat for the past 12 hours. Has not taken any medications for symptoms. Reports symptoms are not severe, but postnasal drip has increased. 3 children and wife with an upper respiratory infection. Negative for strep A infection at pediatrician office. Pt denies history of allergies. Denies cough, ear pain or discharge, fever, chills, fatigue.       Review of Systems  Review of Systems   Constitutional: Negative.  Negative for chills, fatigue, fever and unexpected weight change.   HENT:  Positive for postnasal drip. Negative for congestion, ear discharge, ear pain, rhinorrhea, sinus pressure, sinus pain and tinnitus.    Eyes: Negative.  Negative for pain,

## 2024-05-06 ENCOUNTER — OFFICE VISIT (OUTPATIENT)
Age: 44
End: 2024-05-06
Payer: COMMERCIAL

## 2024-05-06 VITALS
SYSTOLIC BLOOD PRESSURE: 128 MMHG | HEART RATE: 72 BPM | TEMPERATURE: 98.3 F | RESPIRATION RATE: 16 BRPM | WEIGHT: 186 LBS | BODY MASS INDEX: 25.19 KG/M2 | DIASTOLIC BLOOD PRESSURE: 82 MMHG | HEIGHT: 72 IN | OXYGEN SATURATION: 96 %

## 2024-05-06 DIAGNOSIS — E78.1 HYPERTRIGLYCERIDEMIA: ICD-10-CM

## 2024-05-06 DIAGNOSIS — Z00.00 ANNUAL PHYSICAL EXAM: ICD-10-CM

## 2024-05-06 DIAGNOSIS — F41.9 ANXIETY: ICD-10-CM

## 2024-05-06 DIAGNOSIS — H10.021 PINK EYE DISEASE OF RIGHT EYE: Primary | ICD-10-CM

## 2024-05-06 PROCEDURE — 99213 OFFICE O/P EST LOW 20 MIN: CPT | Performed by: NURSE PRACTITIONER

## 2024-05-06 RX ORDER — GUAIFENESIN, PSEUDOEPHEDRINE HYDROCHLORIDE 600; 60 MG/1; MG/1
1 TABLET, EXTENDED RELEASE ORAL EVERY 12 HOURS
COMMUNITY

## 2024-05-06 RX ORDER — ERYTHROMYCIN 5 MG/G
OINTMENT OPHTHALMIC
Qty: 3.5 G | Refills: 0 | Status: SHIPPED | OUTPATIENT
Start: 2024-05-06

## 2024-05-06 ASSESSMENT — ENCOUNTER SYMPTOMS
DIARRHEA: 0
SINUS PAIN: 0
CONSTIPATION: 0
VOMITING: 0
SHORTNESS OF BREATH: 0
EYE DISCHARGE: 1
NAUSEA: 0
ABDOMINAL PAIN: 0
RHINORRHEA: 0
GASTROINTESTINAL NEGATIVE: 1
EYE PAIN: 0
CHEST TIGHTNESS: 0
BACK PAIN: 0
RESPIRATORY NEGATIVE: 1
EYE REDNESS: 1
SINUS PRESSURE: 0
BLOOD IN STOOL: 0
COUGH: 0
EYE ITCHING: 0

## 2024-05-06 NOTE — PROGRESS NOTES
Assessment and Plan     1. Pink eye disease of right eye: Warm compresses to R eye recommended, follow by Erythromycin ointment. Will apply to L eye if he develops symptoms. SXS to return discussed. Pt verbalized understanding  -     erythromycin (ROMYCIN) 5 MG/GM ophthalmic ointment; Instill ~1 cm ribbon into affected eye(s) 4 times daily for 7 days (Ref), Disp-3.5 g, R-0, Normal  2. Hypertriglyceridemia: Diet-controlled. Will check lipids.   -     Lipid Panel; Future  -     Comprehensive Metabolic Panel; Future  3. Anxiety: Stable. Continue with Lexapro.  -     TSH; Future  4. Annual physical exam  -     CBC with Auto Differential; Future  -     Hemoglobin A1C; Future       Benefits, risks, possible drug interactions, and side effects of all new medications were reviewed with the patient. Pt verbalized understanding.    An electronic signature was used to authenticate this note.  Nanci Figueroa, THERESA - CNP  5/6/2024      Follow-up and Dispositions    Return in about 2 months (around 7/6/2024).          History of Present Illness   Chief Complaint     Altaf Delacruz is a 43 y.o. male here for had concerns including Conjunctivitis (Mr. Delacruz presents for evaluation of possible conjunctivitis on the R eye. ).   Mr. Delacruz presents today wiith reports of R eye redness, yellow discharge, onset 12 hours ago. Has not applied any ointment to area. Denies sick contacts. 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 Pt was treated for allergic rhinitis last week. Currently taking daily antihistamine and nasal spray, symptoms are well-controled. Sore throat, nasal congestion and runny nose had resolved. Denies fever, chills, cough.       Review of Systems  Review of Systems   Constitutional: Negative.  Negative for chills, fatigue, fever and unexpected weight change.   HENT: Negative.  Negative for congestion, ear discharge, ear pain, rhinorrhea, sinus pressure and sinus pain.    Eyes:  Positive for discharge and

## 2024-05-31 ENCOUNTER — TELEPHONE (OUTPATIENT)
Age: 44
End: 2024-05-31

## 2024-05-31 RX ORDER — ESCITALOPRAM OXALATE 10 MG/1
10 TABLET ORAL DAILY
Qty: 90 TABLET | Refills: 0 | Status: SHIPPED | OUTPATIENT
Start: 2024-05-31 | End: 2024-06-14 | Stop reason: SDUPTHER

## 2024-05-31 NOTE — TELEPHONE ENCOUNTER
PSR attempted to reach out to patient - phone number is not in service and did not want to leave VM on wifes phone, sent my chart message

## 2024-06-11 DIAGNOSIS — E78.1 HYPERTRIGLYCERIDEMIA: ICD-10-CM

## 2024-06-11 DIAGNOSIS — F41.9 ANXIETY: ICD-10-CM

## 2024-06-11 DIAGNOSIS — Z00.00 ANNUAL PHYSICAL EXAM: ICD-10-CM

## 2024-06-11 LAB
ALBUMIN SERPL-MCNC: 4.3 G/DL (ref 3.5–5)
ALBUMIN/GLOB SERPL: 1.4 (ref 1.1–2.2)
ALP SERPL-CCNC: 85 U/L (ref 45–117)
ALT SERPL-CCNC: 32 U/L (ref 12–78)
ANION GAP SERPL CALC-SCNC: 4 MMOL/L (ref 5–15)
AST SERPL-CCNC: 19 U/L (ref 15–37)
BASOPHILS # BLD: 0.1 K/UL (ref 0–0.1)
BASOPHILS NFR BLD: 1 % (ref 0–1)
BILIRUB SERPL-MCNC: 0.6 MG/DL (ref 0.2–1)
BUN SERPL-MCNC: 13 MG/DL (ref 6–20)
BUN/CREAT SERPL: 17 (ref 12–20)
CALCIUM SERPL-MCNC: 9.6 MG/DL (ref 8.5–10.1)
CHLORIDE SERPL-SCNC: 101 MMOL/L (ref 97–108)
CHOLEST SERPL-MCNC: 245 MG/DL
CO2 SERPL-SCNC: 29 MMOL/L (ref 21–32)
CREAT SERPL-MCNC: 0.75 MG/DL (ref 0.7–1.3)
DIFFERENTIAL METHOD BLD: NORMAL
EOSINOPHIL # BLD: 0.2 K/UL (ref 0–0.4)
EOSINOPHIL NFR BLD: 3 % (ref 0–7)
ERYTHROCYTE [DISTWIDTH] IN BLOOD BY AUTOMATED COUNT: 12.6 % (ref 11.5–14.5)
EST. AVERAGE GLUCOSE BLD GHB EST-MCNC: 97 MG/DL
GLOBULIN SER CALC-MCNC: 3.1 G/DL (ref 2–4)
GLUCOSE SERPL-MCNC: 93 MG/DL (ref 65–100)
HBA1C MFR BLD: 5 % (ref 4–5.6)
HCT VFR BLD AUTO: 43 % (ref 36.6–50.3)
HDLC SERPL-MCNC: 43 MG/DL
HDLC SERPL: 5.7 (ref 0–5)
HGB BLD-MCNC: 14.4 G/DL (ref 12.1–17)
IMM GRANULOCYTES # BLD AUTO: 0 K/UL (ref 0–0.04)
IMM GRANULOCYTES NFR BLD AUTO: 0 % (ref 0–0.5)
LDLC SERPL CALC-MCNC: 159.2 MG/DL (ref 0–100)
LYMPHOCYTES # BLD: 2.1 K/UL (ref 0.8–3.5)
LYMPHOCYTES NFR BLD: 36 % (ref 12–49)
MCH RBC QN AUTO: 30.1 PG (ref 26–34)
MCHC RBC AUTO-ENTMCNC: 33.5 G/DL (ref 30–36.5)
MCV RBC AUTO: 89.8 FL (ref 80–99)
MONOCYTES # BLD: 0.5 K/UL (ref 0–1)
MONOCYTES NFR BLD: 8 % (ref 5–13)
NEUTS SEG # BLD: 3 K/UL (ref 1.8–8)
NEUTS SEG NFR BLD: 52 % (ref 32–75)
NRBC # BLD: 0 K/UL (ref 0–0.01)
NRBC BLD-RTO: 0 PER 100 WBC
PLATELET # BLD AUTO: 252 K/UL (ref 150–400)
PMV BLD AUTO: 11.3 FL (ref 8.9–12.9)
POTASSIUM SERPL-SCNC: 4.8 MMOL/L (ref 3.5–5.1)
PROT SERPL-MCNC: 7.4 G/DL (ref 6.4–8.2)
RBC # BLD AUTO: 4.79 M/UL (ref 4.1–5.7)
SODIUM SERPL-SCNC: 134 MMOL/L (ref 136–145)
TRIGL SERPL-MCNC: 214 MG/DL
TSH SERPL DL<=0.05 MIU/L-ACNC: 1.9 UIU/ML (ref 0.36–3.74)
VLDLC SERPL CALC-MCNC: 42.8 MG/DL
WBC # BLD AUTO: 5.8 K/UL (ref 4.1–11.1)

## 2024-06-14 ENCOUNTER — OFFICE VISIT (OUTPATIENT)
Age: 44
End: 2024-06-14
Payer: COMMERCIAL

## 2024-06-14 VITALS
BODY MASS INDEX: 25.03 KG/M2 | DIASTOLIC BLOOD PRESSURE: 74 MMHG | OXYGEN SATURATION: 97 % | WEIGHT: 184.8 LBS | RESPIRATION RATE: 19 BRPM | HEIGHT: 72 IN | TEMPERATURE: 98.1 F | SYSTOLIC BLOOD PRESSURE: 119 MMHG | HEART RATE: 61 BPM

## 2024-06-14 DIAGNOSIS — Z00.00 PREVENTATIVE HEALTH CARE: Primary | ICD-10-CM

## 2024-06-14 DIAGNOSIS — E78.2 MIXED HYPERLIPIDEMIA: ICD-10-CM

## 2024-06-14 DIAGNOSIS — E87.1 HYPONATREMIA: ICD-10-CM

## 2024-06-14 DIAGNOSIS — Z12.11 SCREEN FOR COLON CANCER: ICD-10-CM

## 2024-06-14 DIAGNOSIS — F41.9 ANXIETY: ICD-10-CM

## 2024-06-14 PROCEDURE — 99396 PREV VISIT EST AGE 40-64: CPT | Performed by: INTERNAL MEDICINE

## 2024-06-14 RX ORDER — ESCITALOPRAM OXALATE 5 MG/1
5 TABLET ORAL DAILY
Qty: 90 TABLET | Refills: 1 | Status: SHIPPED | OUTPATIENT
Start: 2024-06-14

## 2024-06-14 NOTE — PROGRESS NOTES
Altaf Delacruz is a 43 y.o. male  Presenting for his annual checkup and health maintenance review and follow-up    Recent labs showed moderate dyslipidemia and mild hyponatremia with sodium 134.  He is not taking any medications for cholesterol or diuretics.    Chronic anxiety.  Has been taking Lexapro 10 mg daily and would like to wean back since he is doing overall much better.  Denies any insomnia, substance use, suicidal or homicidal thoughts.    Lab Results   Component Value Date    CHOL 245 (H) 06/11/2024    TRIG 214 (H) 06/11/2024    HDL 43 06/11/2024    .2 (H) 06/11/2024    VLDL 42.8 06/11/2024    CHOLHDLRATIO 5.7 (H) 06/11/2024       Wt Readings from Last 3 Encounters:   06/14/24 83.8 kg (184 lb 12.8 oz)   05/06/24 84.4 kg (186 lb)   04/30/24 84.7 kg (186 lb 12.8 oz)       Lifestyle: generally follows a low fat low cholesterol diet    Health Maintenance   Topic Date Due    COVID-19 Vaccine (8 - 2023-24 season) 09/01/2023    Colorectal Cancer Screen  Never done    Flu vaccine (Season Ended) 08/01/2024    DTaP/Tdap/Td vaccine (2 - Td or Tdap) 12/19/2024    Depression Screen  04/30/2025    Lipids  06/11/2029    Hepatitis A vaccine  Aged Out    Hib vaccine  Aged Out    HPV vaccine  Aged Out    Polio vaccine  Aged Out    Meningococcal (ACWY) vaccine  Aged Out    Pneumococcal 0-64 years Vaccine  Aged Out    Hepatitis B vaccine  Discontinued    Hepatitis C screen  Discontinued    HIV screen  Discontinued     Health Maintenance reviewed  Last digital rectal exam:  none  No results found for: \"PSA\", \"PSA2\", \"PSAR1\", \"PSA1\", \"PSA3\", \"[id number]\"    Vaccinations reviewed  Immunization History   Administered Date(s) Administered    COVID-19, PFIZER PURPLE top, DILUTE for use, (age 12 y+), 30mcg/0.3mL 12/28/2020, 12/28/2020, 12/28/2020, 01/18/2021, 01/18/2021, 01/18/2021, 11/22/2021    Hepatitis B vaccine 01/01/1997    Influenza Virus Vaccine 01/01/2007, 12/19/2014, 10/19/2017, 10/01/2018, 10/17/2019, 
\"Have you been to the ER, urgent care clinic since your last visit?  Hospitalized since your last visit?\"    NO    “Have you seen or consulted any other health care providers outside of [facility] since your last visit?”    NO      “Have you had a colorectal cancer screening such as a colonoscopy/FIT/Cologuard?    NO    No colonoscopy on file  No cologuard on file  No FIT/FOBT on file   No flexible sigmoidoscopy on file         Click Here for Release of Records Request    
thyromegaly or carotid bruits. Cranial nerves normal. Lungs are clear to auscultation. Heart sounds are normal with no murmurs, clicks, gallops or rubs. Abdomen is soft, non- tender, with no masses or organomegaly. Extremities, peripheral pulses and reflexes are normal.  . RECTAL/PROSTATE EXAM: {:[id number]}.   Skin is without rashes or suspicious lesions.      Assessment and Plan:    There are no diagnoses linked to this encounter.    The patient is asked to make an attempt to improve diet and exercise patterns  Avoid tobacco products, excessive alcohol    Return for yearly wellness visits

## 2024-06-15 PROBLEM — E78.2 MIXED HYPERLIPIDEMIA: Status: ACTIVE | Noted: 2024-06-15

## 2024-06-15 PROBLEM — E78.5 DYSLIPIDEMIA: Status: ACTIVE | Noted: 2024-06-15

## 2024-09-10 LAB
CHOLEST SERPL-MCNC: 256 MG/DL
GLUCOSE SERPL-MCNC: 96 MG/DL (ref 65–100)
HDLC SERPL-MCNC: 43 MG/DL
LDLC SERPL CALC-MCNC: 177.2 MG/DL (ref 0–100)
TRIGL SERPL-MCNC: 179 MG/DL

## 2024-10-17 NOTE — FLOWSHEET NOTE
- Friday 8a - 11am. There are no Saturday or Sunday swabbing at any [facility] facility. (patient verbalizes understanding) not applicable

## 2024-10-22 ENCOUNTER — ANESTHESIA EVENT (OUTPATIENT)
Facility: HOSPITAL | Age: 44
End: 2024-10-22
Payer: COMMERCIAL

## 2024-10-23 ENCOUNTER — HOSPITAL ENCOUNTER (OUTPATIENT)
Facility: HOSPITAL | Age: 44
Setting detail: OUTPATIENT SURGERY
Discharge: HOME | End: 2024-10-23
Attending: INTERNAL MEDICINE | Admitting: INTERNAL MEDICINE
Payer: COMMERCIAL

## 2024-10-23 ENCOUNTER — ANESTHESIA (OUTPATIENT)
Facility: HOSPITAL | Age: 44
End: 2024-10-23
Payer: COMMERCIAL

## 2024-10-23 VITALS
OXYGEN SATURATION: 100 % | HEIGHT: 71 IN | WEIGHT: 178.79 LBS | HEART RATE: 61 BPM | TEMPERATURE: 98.8 F | DIASTOLIC BLOOD PRESSURE: 74 MMHG | BODY MASS INDEX: 25.03 KG/M2 | RESPIRATION RATE: 14 BRPM | SYSTOLIC BLOOD PRESSURE: 114 MMHG

## 2024-10-23 PROCEDURE — 6360000002 HC RX W HCPCS: Performed by: NURSE ANESTHETIST, CERTIFIED REGISTERED

## 2024-10-23 PROCEDURE — 7100000011 HC PHASE II RECOVERY - ADDTL 15 MIN: Performed by: INTERNAL MEDICINE

## 2024-10-23 PROCEDURE — 7100000010 HC PHASE II RECOVERY - FIRST 15 MIN: Performed by: INTERNAL MEDICINE

## 2024-10-23 PROCEDURE — 88305 TISSUE EXAM BY PATHOLOGIST: CPT

## 2024-10-23 PROCEDURE — 3700000000 HC ANESTHESIA ATTENDED CARE: Performed by: INTERNAL MEDICINE

## 2024-10-23 PROCEDURE — 3600007502: Performed by: INTERNAL MEDICINE

## 2024-10-23 PROCEDURE — 3600007512: Performed by: INTERNAL MEDICINE

## 2024-10-23 PROCEDURE — 3700000001 HC ADD 15 MINUTES (ANESTHESIA): Performed by: INTERNAL MEDICINE

## 2024-10-23 RX ORDER — SODIUM CHLORIDE 9 MG/ML
INJECTION, SOLUTION INTRAVENOUS CONTINUOUS
Status: DISCONTINUED | OUTPATIENT
Start: 2024-10-23 | End: 2024-10-23 | Stop reason: HOSPADM

## 2024-10-23 RX ORDER — PROPOFOL 10 MG/ML
INJECTION, EMULSION INTRAVENOUS
Status: DISCONTINUED | OUTPATIENT
Start: 2024-10-23 | End: 2024-10-23 | Stop reason: SDUPTHER

## 2024-10-23 RX ADMIN — PROPOFOL 50 MG: 10 INJECTION, EMULSION INTRAVENOUS at 07:56

## 2024-10-23 RX ADMIN — PROPOFOL 20 MG: 10 INJECTION, EMULSION INTRAVENOUS at 08:08

## 2024-10-23 RX ADMIN — PROPOFOL 50 MG: 10 INJECTION, EMULSION INTRAVENOUS at 08:00

## 2024-10-23 RX ADMIN — PROPOFOL 20 MG: 10 INJECTION, EMULSION INTRAVENOUS at 08:10

## 2024-10-23 RX ADMIN — PROPOFOL 50 MG: 10 INJECTION, EMULSION INTRAVENOUS at 08:02

## 2024-10-23 RX ADMIN — PROPOFOL 50 MG: 10 INJECTION, EMULSION INTRAVENOUS at 07:58

## 2024-10-23 RX ADMIN — PROPOFOL 25 MG: 10 INJECTION, EMULSION INTRAVENOUS at 08:04

## 2024-10-23 RX ADMIN — PROPOFOL 25 MG: 10 INJECTION, EMULSION INTRAVENOUS at 08:06

## 2024-10-23 ASSESSMENT — PAIN - FUNCTIONAL ASSESSMENT: PAIN_FUNCTIONAL_ASSESSMENT: 0-10

## 2024-10-23 NOTE — DISCHARGE INSTRUCTIONS
[facility]  [address] [place] [address]  [phone]                   Altaf Delacruz  [id number]  1980    COLON DISCHARGE INSTRUCTIONS    DISCOMFORT:  Redness at IV site- apply warm compress to area; if redness or soreness persist- contact your physician  There may be a slight amount of blood passed from the rectum  Gaseous discomfort- walking, belching will help relieve any discomfort  You may not operate a vehicle for 12 hours  You may not  engage in an occupation involving machinery or appliances for rest of today  You may not  drink alcoholic beverages for at least 12 hours  Avoid making any critical decisions for at least 24 hour    DIET:   High fiber diet.   - however -  remember your colon is empty and a heavy meal will produce gas.   Avoid these foods:  vegetables, fried / greasy foods, carbonated drinks for today     ACTIVITY:  It is recommended that you spend the remainder of the day resting -  avoid any strenuous activity.  CALL M.D.  ANY SIGN OF:   Increasing pain, nausea, vomiting  Abdominal distension (swelling)  New increased bleeding (oral or rectal)  Fever (chills)  Pain in chest area  Bloody discharge from nose or mouth  Shortness of breath    You may resume all medications.     Post procedure diagnosis: small polyp ( removed) otherwise normal endoscopy    Follow-up Instructions:    If a specimen was collected, you will receive a letter with the result by mail within two  weeks. Depending on the result this letter will specify your follow up colonoscopy date.      Please call us for any questions or concerns                     Altaf Delacruz  [id number]  1980        DISCHARGE SUMMARY from Nurse    The following personal items collected during your admission are returned to you:   Dental Appliance:    Vision:    Hearing Aid:    Jewelry:    Clothing:    Other Valuables:    Valuables sent to safe:

## 2024-10-23 NOTE — ANESTHESIA PRE PROCEDURE
Department of Anesthesiology  Preprocedure Note       Name:  Altaf Delacruz   Age:  43 y.o.  :  1980                                          MRN:  [id number]         Date:  10/23/2024      Surgeon: Surgeon(s):  Joon Amezquita MD    Procedure: Procedure(s):  COLONOSCOPY BIOPSY    Medications prior to admission:   Prior to Admission medications    Medication Sig Start Date End Date Taking? Authorizing Provider   Multiple Vitamins-Minerals (MENS MULTIVITAMIN PO) Take by mouth Chews 2 gummies once a day.   Yes Provider, MD Misti   escitalopram (LEXAPRO) 5 MG tablet Take 1 tablet by mouth daily Decreased 24  Yes Marques Marx MD   ibuprofen (ADVIL;MOTRIN) 200 MG tablet Take 2 tablets by mouth as needed   Yes Automatic Reconciliation, Ar       Current medications:    No current facility-administered medications for this encounter.       Allergies:  No Known Allergies    Problem List:    Patient Active Problem List   Diagnosis Code    FH: CAD (coronary artery disease) Z82.49    Hypertriglyceridemia E78.1    Low HDL (under 40) E78.6    Incomplete right bundle branch block I45.10    Anxiety F41.9    Paresthesia R20.2    Heart palpitations R00.2    Lipoma of arm D17.20    Dyslipidemia E78.5    Mixed hyperlipidemia E78.2       Past Medical History:        Diagnosis Date    Anxiety     Cerumen impaction     Deafness in right ear     80% deaf on right    FH: CAD (coronary artery disease)     father with mild CAD age 51    Heart palpitations     Dr. Beaver.  stress echo, EKG done    Hypertriglyceridemia 10/6/2020    Incomplete right bundle branch block 2014    Lipoma of arm 2014    left medial    Low HDL (under 40) 10/6/2020    Normal cardiac stress test 14    normal    Patellofemoral instability     hx left surgery       Past Surgical History:        Procedure Laterality Date    KNEE ARTHROSCOPY Left 2011, 10/2011    left x 2.  femur    OTHER SURGICAL HISTORY      oral surgery x3

## 2024-10-23 NOTE — OP NOTE
[facility]  [address] [place] [address]  [phone]                   Colonoscopy Operative Report      Indications:    Screening colonoscopy     :  Joon Amezquita MD    Assistants: None    Referring Provider: Marques Marx MD    Sedation:  MAC anesthesia Propofol    Procedure Details:  After informed consent was obtained with all risks and benefits of procedure explained and preoperative exam completed, the patient was taken to the endoscopy suite and placed in the left lateral decubitus position.  Upon sequential sedation as per above, a digital rectal exam was performed  And was normal.  The Olympus videocolonoscope  was inserted in the rectum and carefully advanced to the cecum, which was identified by the ileocecal valve and appendiceal orifice, terminal ileum.  The quality of preparation was excellent.  The colonoscope was slowly withdrawn with careful evaluation between folds. Retroflexion in the rectum was performed and was normal..     Findings:   Rectum: 1  Sessile polyp(s), the largest 3 mm in size;  Sigmoid: normal  Descending Colon: normal  Transverse Colon: normal  Ascending Colon: normal  Cecum: normal  Terminal Ileum: normal    Interventions:  1 complete polypectomy were performed using cold biopsy forceps and the polyps was  retrieved    Specimen Removed:  specimen #1, 3 mm in size, located in the rectum removed by cold biopsy and sent for pathology    Implants: same    Complications: None.     EBL:  None.    Impression: small colon polyp ( removed) otherwise normal exam     Recommendations:   -If adenoma is present, repeat colonoscopy in 5 years.  -High fiber diet.     -Resume normal medication(s)  -Call office for pathology results in 1 week  -Call for any questions/concerns        Discharge Disposition:  Home in the company of a  when able to ambulate.    Joon Amezquita MD  10/23/2024  8:15 AM

## 2024-10-23 NOTE — ANESTHESIA POSTPROCEDURE EVALUATION
Department of Anesthesiology  Postprocedure Note    Patient: Altaf Delacruz  MRN: [id number]  YOB: 1980  Date of evaluation: 10/23/2024    Procedure Summary       Date: 10/23/24 Room / Location: [facility] ENDO 03 / [facility] ENDOSCOPY    Anesthesia Start: 0753 Anesthesia Stop: 0814    Procedures:       COLONOSCOPY BIOPSY (Lower GI Region)      COLONOSCOPY POLYPECTOMY SNARE/BIOPSY (Lower GI Region) Diagnosis:       Screen for colon cancer      (Screen for colon cancer [Z12.11])    Surgeons: Joon Amezquita MD Responsible Provider: Danny Vasquez MD    Anesthesia Type: MAC ASA Status: 2            Anesthesia Type: MAC    Kell Phase I: Kell Score: 10    Kell Phase II: Kell Score: 10    Anesthesia Post Evaluation    Patient location during evaluation: PACU  Patient participation: complete - patient participated  Level of consciousness: awake  Airway patency: patent  Nausea & Vomiting: no vomiting and no nausea  Cardiovascular status: hemodynamically stable  Respiratory status: acceptable  Hydration status: stable  Pain management: adequate    No notable events documented.

## 2024-12-11 DIAGNOSIS — F41.9 ANXIETY: ICD-10-CM

## 2024-12-11 RX ORDER — ESCITALOPRAM OXALATE 5 MG/1
5 TABLET ORAL DAILY
Qty: 90 TABLET | Refills: 1 | Status: SHIPPED | OUTPATIENT
Start: 2024-12-11

## 2025-06-17 NOTE — H&P
[facility]  [address] [place] [address]  [phone]                     History and Physical     NAME: Altaf Delacruz   :  1980   MRN:  [id number]     HPI:  PT presents for screening colonoscopy. Reports no stx and no family hx of CRC     Past Surgical History:   Procedure Laterality Date    KNEE ARTHROSCOPY Left 2011, 10/2011    left x 2.  femur    OTHER SURGICAL HISTORY      oral surgery x3    WISDOM TOOTH EXTRACTION       Past Medical History:   Diagnosis Date    Anxiety     Cerumen impaction     Deafness in right ear     80% deaf on right    FH: CAD (coronary artery disease)     father with mild CAD age 51    Heart palpitations     Dr. Beaver.  stress echo, EKG done    Hypertriglyceridemia 10/6/2020    Incomplete right bundle branch block 2014    Lipoma of arm 2014    left medial    Low HDL (under 40) 10/6/2020    Normal cardiac stress test 14    normal    Patellofemoral instability     hx left surgery     Social History     Tobacco Use    Smoking status: Never    Smokeless tobacco: Never   Vaping Use    Vaping status: Never Used   Substance Use Topics    Alcohol use: Yes     Alcohol/week: 3.0 standard drinks of alcohol     Types: 3 Cans of beer per week     Comment: 0-3 beers per week    Drug use: No     No Known Allergies  Family History   Problem Relation Age of Onset    Diabetes Mother     Elevated Lipids Mother     Hypertension Father     Cancer Father         salivary glands-    Coronary Art Dis Father 51        mild LAD dz on cath age 51    Colon Cancer Maternal Grandmother         rectal    Cancer Maternal Grandfather         leukemia     No current facility-administered medications for this encounter.         PHYSICAL EXAM:  General: WD, WN. Alert, cooperative, no acute distress    HEENT: NC, Atraumatic.  PERRLA, EOMI. Anicteric sclerae.  Lungs:  CTA Bilaterally. No Wheezing/Rhonchi/Rales.  Heart:  Regular  rhythm,  No  PERRL/EOMI/conjunctiva clear/normal

## 2025-07-10 LAB
CHOLEST SERPL-MCNC: 225 MG/DL
GLUCOSE SERPL-MCNC: 93 MG/DL (ref 65–100)
HDLC SERPL-MCNC: 46 MG/DL
LDLC SERPL CALC-MCNC: 153.4 MG/DL (ref 0–100)
TRIGL SERPL-MCNC: 128 MG/DL

## 2025-07-14 DIAGNOSIS — F41.9 ANXIETY: ICD-10-CM

## 2025-07-14 RX ORDER — ESCITALOPRAM OXALATE 5 MG/1
5 TABLET ORAL DAILY
Qty: 90 TABLET | Refills: 0 | OUTPATIENT
Start: 2025-07-14

## 2025-07-19 DIAGNOSIS — F41.9 ANXIETY: ICD-10-CM

## 2025-07-21 RX ORDER — ESCITALOPRAM OXALATE 5 MG/1
5 TABLET ORAL DAILY
Qty: 90 TABLET | Refills: 0 | Status: SHIPPED | OUTPATIENT
Start: 2025-07-21